# Patient Record
Sex: MALE | Race: WHITE | URBAN - METROPOLITAN AREA
[De-identification: names, ages, dates, MRNs, and addresses within clinical notes are randomized per-mention and may not be internally consistent; named-entity substitution may affect disease eponyms.]

---

## 2018-01-31 ENCOUNTER — EMERGENCY (EMERGENCY)
Facility: HOSPITAL | Age: 28
LOS: 0 days | Discharge: HOME | End: 2018-01-31
Attending: EMERGENCY MEDICINE | Admitting: EMERGENCY MEDICINE
Payer: SELF-PAY

## 2018-01-31 VITALS
TEMPERATURE: 98 F | DIASTOLIC BLOOD PRESSURE: 85 MMHG | OXYGEN SATURATION: 100 % | RESPIRATION RATE: 17 BRPM | SYSTOLIC BLOOD PRESSURE: 130 MMHG | HEART RATE: 84 BPM

## 2018-01-31 VITALS — WEIGHT: 250 LBS | HEIGHT: 70 IN

## 2018-01-31 DIAGNOSIS — B34.9 VIRAL INFECTION, UNSPECIFIED: ICD-10-CM

## 2018-01-31 DIAGNOSIS — R05 COUGH: ICD-10-CM

## 2018-01-31 DIAGNOSIS — R06.2 WHEEZING: ICD-10-CM

## 2018-01-31 DIAGNOSIS — F12.90 CANNABIS USE, UNSPECIFIED, UNCOMPLICATED: ICD-10-CM

## 2018-01-31 PROCEDURE — 99284 EMERGENCY DEPT VISIT MOD MDM: CPT

## 2018-01-31 RX ORDER — IPRATROPIUM/ALBUTEROL SULFATE 18-103MCG
3 AEROSOL WITH ADAPTER (GRAM) INHALATION ONCE
Qty: 0 | Refills: 0 | Status: COMPLETED | OUTPATIENT
Start: 2018-01-31 | End: 2018-01-31

## 2018-01-31 RX ORDER — AZITHROMYCIN 500 MG/1
1 TABLET, FILM COATED ORAL
Qty: 6 | Refills: 0
Start: 2018-01-31 | End: 2018-02-05

## 2018-01-31 RX ORDER — ALBUTEROL 90 UG/1
2 AEROSOL, METERED ORAL ONCE
Qty: 0 | Refills: 0 | Status: COMPLETED | OUTPATIENT
Start: 2018-01-31 | End: 2018-01-31

## 2018-01-31 RX ADMIN — Medication 60 MILLIGRAM(S): at 21:40

## 2018-01-31 RX ADMIN — Medication 3 MILLILITER(S): at 21:19

## 2018-01-31 RX ADMIN — ALBUTEROL 2 PUFF(S): 90 AEROSOL, METERED ORAL at 21:41

## 2018-01-31 RX ADMIN — Medication 75 MILLIGRAM(S): at 21:40

## 2018-01-31 NOTE — ED STATDOCS - ATTENDING CONTRIBUTION TO CARE
Attending Contribution to Care: I, Tanesha Norman, performed the initial face to face bedside interview with this patient regarding history of present illness, review of symptoms and relevant past medical, social and family history.  I completed an independent physical examination.  I was the initial provider who evaluated this patient and the history, physical, and MDM reflect this intial assessment. I have signed out the follow up of any pending tests after the original (i.e. labs, radiological studies) to the ACP with instructions to review any with instructions to review any concerning findings to me prior to discharge.  I have communicated the patient’s plan of care and disposition with the ACP.

## 2018-01-31 NOTE — ED STATDOCS - PROGRESS NOTE DETAILS
DUANE Massey:   Patient has been seen, evaluated and orders have been written by the attending in intake. Patient is stable.  I will follow up the results of orders written and I will continue to evaluate/observe the patient.  Pt. daily marijuana smoker.  Pt. denies tobacco use.  Pt. with chronic cough and wheezing.  Will give nebs/tamiflu/prednisone.

## 2018-01-31 NOTE — ED ADULT NURSE NOTE - CHPI ED SYMPTOMS NEG
no vomiting/no tingling/no dizziness/no weakness/no chills/no decreased eating/drinking/no numbness/no fever

## 2018-01-31 NOTE — ED ADULT NURSE NOTE - OBJECTIVE STATEMENT
Pt presented to Ed c/o flu like symptoms. as per pt, pt had a sudden onset of bodyaches, and fever starting today. been experiencing chest congestions and cough for 3 months.

## 2018-01-31 NOTE — ED STATDOCS - OBJECTIVE STATEMENT
28 y/o M with no PMHx presents to the ED c/o body aches, fever and cough starting today. Pt also c/o chest congestion and cough starting 3 months ago. Pt states that he wakes up every morning gasping for air. Pt did not get his flu shot this year. No PMD at this time. +marijuana use daily.

## 2018-12-27 ENCOUNTER — TRANSCRIPTION ENCOUNTER (OUTPATIENT)
Age: 28
End: 2018-12-27

## 2021-03-11 ENCOUNTER — EMERGENCY (EMERGENCY)
Facility: HOSPITAL | Age: 31
LOS: 0 days | Discharge: HOME | End: 2021-03-11
Attending: EMERGENCY MEDICINE
Payer: COMMERCIAL

## 2021-03-11 VITALS
SYSTOLIC BLOOD PRESSURE: 126 MMHG | HEART RATE: 74 BPM | TEMPERATURE: 98 F | RESPIRATION RATE: 18 BRPM | OXYGEN SATURATION: 95 % | DIASTOLIC BLOOD PRESSURE: 79 MMHG

## 2021-03-11 VITALS — WEIGHT: 250 LBS | HEIGHT: 70 IN

## 2021-03-11 DIAGNOSIS — F12.90 CANNABIS USE, UNSPECIFIED, UNCOMPLICATED: ICD-10-CM

## 2021-03-11 DIAGNOSIS — J45.909 UNSPECIFIED ASTHMA, UNCOMPLICATED: ICD-10-CM

## 2021-03-11 DIAGNOSIS — R42 DIZZINESS AND GIDDINESS: ICD-10-CM

## 2021-03-11 DIAGNOSIS — R19.7 DIARRHEA, UNSPECIFIED: ICD-10-CM

## 2021-03-11 DIAGNOSIS — H81.10 BENIGN PAROXYSMAL VERTIGO, UNSPECIFIED EAR: ICD-10-CM

## 2021-03-11 LAB
ALBUMIN SERPL ELPH-MCNC: 3.8 G/DL — SIGNIFICANT CHANGE UP (ref 3.3–5)
ALP SERPL-CCNC: 76 U/L — SIGNIFICANT CHANGE UP (ref 40–120)
ALT FLD-CCNC: 52 U/L — SIGNIFICANT CHANGE UP (ref 12–78)
ANION GAP SERPL CALC-SCNC: 4 MMOL/L — LOW (ref 5–17)
AST SERPL-CCNC: 21 U/L — SIGNIFICANT CHANGE UP (ref 15–37)
BASOPHILS # BLD AUTO: 0.07 K/UL — SIGNIFICANT CHANGE UP (ref 0–0.2)
BASOPHILS NFR BLD AUTO: 1 % — SIGNIFICANT CHANGE UP (ref 0–2)
BILIRUB SERPL-MCNC: 0.5 MG/DL — SIGNIFICANT CHANGE UP (ref 0.2–1.2)
BUN SERPL-MCNC: 16 MG/DL — SIGNIFICANT CHANGE UP (ref 7–23)
CALCIUM SERPL-MCNC: 8.5 MG/DL — SIGNIFICANT CHANGE UP (ref 8.5–10.1)
CHLORIDE SERPL-SCNC: 108 MMOL/L — SIGNIFICANT CHANGE UP (ref 96–108)
CO2 SERPL-SCNC: 26 MMOL/L — SIGNIFICANT CHANGE UP (ref 22–31)
CREAT SERPL-MCNC: 0.87 MG/DL — SIGNIFICANT CHANGE UP (ref 0.5–1.3)
EOSINOPHIL # BLD AUTO: 0.53 K/UL — HIGH (ref 0–0.5)
EOSINOPHIL NFR BLD AUTO: 7.6 % — HIGH (ref 0–6)
GLUCOSE SERPL-MCNC: 100 MG/DL — HIGH (ref 70–99)
HCT VFR BLD CALC: 48 % — SIGNIFICANT CHANGE UP (ref 39–50)
HGB BLD-MCNC: 16.4 G/DL — SIGNIFICANT CHANGE UP (ref 13–17)
IMM GRANULOCYTES NFR BLD AUTO: 0.7 % — SIGNIFICANT CHANGE UP (ref 0–1.5)
LYMPHOCYTES # BLD AUTO: 2.56 K/UL — SIGNIFICANT CHANGE UP (ref 1–3.3)
LYMPHOCYTES # BLD AUTO: 36.9 % — SIGNIFICANT CHANGE UP (ref 13–44)
MAGNESIUM SERPL-MCNC: 2.1 MG/DL — SIGNIFICANT CHANGE UP (ref 1.6–2.6)
MCHC RBC-ENTMCNC: 29.4 PG — SIGNIFICANT CHANGE UP (ref 27–34)
MCHC RBC-ENTMCNC: 34.2 GM/DL — SIGNIFICANT CHANGE UP (ref 32–36)
MCV RBC AUTO: 86 FL — SIGNIFICANT CHANGE UP (ref 80–100)
MONOCYTES # BLD AUTO: 0.6 K/UL — SIGNIFICANT CHANGE UP (ref 0–0.9)
MONOCYTES NFR BLD AUTO: 8.7 % — SIGNIFICANT CHANGE UP (ref 2–14)
NEUTROPHILS # BLD AUTO: 3.12 K/UL — SIGNIFICANT CHANGE UP (ref 1.8–7.4)
NEUTROPHILS NFR BLD AUTO: 45.1 % — SIGNIFICANT CHANGE UP (ref 43–77)
PHOSPHATE SERPL-MCNC: 2.6 MG/DL — SIGNIFICANT CHANGE UP (ref 2.5–4.5)
PLATELET # BLD AUTO: 272 K/UL — SIGNIFICANT CHANGE UP (ref 150–400)
POTASSIUM SERPL-MCNC: 3.9 MMOL/L — SIGNIFICANT CHANGE UP (ref 3.5–5.3)
POTASSIUM SERPL-SCNC: 3.9 MMOL/L — SIGNIFICANT CHANGE UP (ref 3.5–5.3)
PROT SERPL-MCNC: 7.7 GM/DL — SIGNIFICANT CHANGE UP (ref 6–8.3)
RBC # BLD: 5.58 M/UL — SIGNIFICANT CHANGE UP (ref 4.2–5.8)
RBC # FLD: 12.2 % — SIGNIFICANT CHANGE UP (ref 10.3–14.5)
SODIUM SERPL-SCNC: 138 MMOL/L — SIGNIFICANT CHANGE UP (ref 135–145)
WBC # BLD: 6.93 K/UL — SIGNIFICANT CHANGE UP (ref 3.8–10.5)
WBC # FLD AUTO: 6.93 K/UL — SIGNIFICANT CHANGE UP (ref 3.8–10.5)

## 2021-03-11 PROCEDURE — 93005 ELECTROCARDIOGRAM TRACING: CPT

## 2021-03-11 PROCEDURE — 85025 COMPLETE CBC W/AUTO DIFF WBC: CPT

## 2021-03-11 PROCEDURE — 80053 COMPREHEN METABOLIC PANEL: CPT

## 2021-03-11 PROCEDURE — 36415 COLL VENOUS BLD VENIPUNCTURE: CPT

## 2021-03-11 PROCEDURE — 84100 ASSAY OF PHOSPHORUS: CPT

## 2021-03-11 PROCEDURE — 70450 CT HEAD/BRAIN W/O DYE: CPT | Mod: 26

## 2021-03-11 PROCEDURE — 99284 EMERGENCY DEPT VISIT MOD MDM: CPT | Mod: 25

## 2021-03-11 PROCEDURE — 93010 ELECTROCARDIOGRAM REPORT: CPT

## 2021-03-11 PROCEDURE — 70450 CT HEAD/BRAIN W/O DYE: CPT

## 2021-03-11 PROCEDURE — 83735 ASSAY OF MAGNESIUM: CPT

## 2021-03-11 PROCEDURE — 99285 EMERGENCY DEPT VISIT HI MDM: CPT

## 2021-03-11 RX ORDER — MECLIZINE HCL 12.5 MG
1 TABLET ORAL
Qty: 21 | Refills: 0
Start: 2021-03-11 | End: 2021-03-17

## 2021-03-11 RX ORDER — MECLIZINE HCL 12.5 MG
25 TABLET ORAL ONCE
Refills: 0 | Status: COMPLETED | OUTPATIENT
Start: 2021-03-11 | End: 2021-03-11

## 2021-03-11 RX ORDER — SODIUM CHLORIDE 9 MG/ML
1000 INJECTION INTRAMUSCULAR; INTRAVENOUS; SUBCUTANEOUS ONCE
Refills: 0 | Status: COMPLETED | OUTPATIENT
Start: 2021-03-11 | End: 2021-03-11

## 2021-03-11 RX ADMIN — SODIUM CHLORIDE 2000 MILLILITER(S): 9 INJECTION INTRAMUSCULAR; INTRAVENOUS; SUBCUTANEOUS at 12:35

## 2021-03-11 RX ADMIN — Medication 25 MILLIGRAM(S): at 12:35

## 2021-03-11 NOTE — ED STATDOCS - ATTENDING CONTRIBUTION TO CARE
I, Pineda Castro MD,  performed the initial face to face bedside interview with this patient regarding history of present illness, review of symptoms and relevant past medical, social and family history.  I completed an independent physical examination.  I was the initial provider who evaluated this patient. I have signed out the follow up of any pending tests (i.e. labs, radiological studies) to the ACP.  I have communicated the patient’s plan of care and disposition with the ACP.  The history, relevant review of systems, past medical and surgical history, medical decision making, and physical examination was documented by the scribe in my presence and I attest to the accuracy of the documentation.

## 2021-03-11 NOTE — ED STATDOCS - PATIENT PORTAL LINK FT
You can access the FollowMyHealth Patient Portal offered by [facility] by registering at the following website: [email or web address]. By joining [employer]s FollowMyHealth portal, you will also be able to view your health information using other applications (apps) compatible with our system.

## 2021-03-11 NOTE — ED STATDOCS - EYES, MLM
clear bilaterally.  Pupils equal, round, and reactive to light. Horizontal nystagmus with fast twitch to left.

## 2021-03-11 NOTE — ED ADULT NURSE NOTE - NSIMPLEMENTINTERV_GEN_ALL_ED
Implemented All Universal Safety Interventions:  [place] to call system. Call bell, personal items and telephone within reach. Instruct patient to call for assistance. Room bathroom lighting operational. Non-slip footwear when patient is off stretcher. Physically safe environment: no spills, clutter or unnecessary equipment. Stretcher in lowest position, wheels locked, appropriate side rails in place.

## 2021-03-11 NOTE — ED STATDOCS - OBJECTIVE STATEMENT
29 y/o male with PMHx of asthma presents to ED for intermittent dizziness starting this morning. Pt woke up 6 am, had breakfast, took a nap, then woke up feeling dizzy. Dizziness worse with positional change. Described as room spinning. Reports some nausea, some diarrhea. Non smoker, non drinker. Smokes marijuana. Had COVID vaccine, Moderna, received second dose 2-3 weeks ago. No syncope.

## 2021-03-11 NOTE — ED STATDOCS - NEUROLOGICAL, MLM
sensation is normal and strength is normal. CN 2-12 intact. 5/5 strength throughout. Rapid alternating movements intact. Negative Romberg.

## 2021-03-11 NOTE — ED STATDOCS - NSFOLLOWUPINSTRUCTIONS_ED_ALL_ED_FT
Benign Paroxysmal Positional Vertigo    WHAT YOU NEED TO KNOW:    BPPV is an inner ear condition that causes you to suddenly feel dizzy. Benign means it is not serious or life-threatening. BPPV is caused by a problem with the nerves and structure of your inner ear. BPPV happens when small pieces of calcium break loose and lump together in one of your inner ear canals. Ear Anatomy         DISCHARGE INSTRUCTIONS:    Return to the emergency department if:     You fall during a BPPV episode and are injured.      You have a severe headache that does not go away.      You have new changes in your vision or feel weak or confused.      You have problems hearing, or you have ringing or buzzing in your ears.    Contact your healthcare provider if:     Your BPPV symptoms do not go away or they return.      You have problems with your balance, or you are falling often.      You have new or increased nausea or vomiting with vertigo.      You feel anxious or depressed and do not want to leave your home.      You have questions or concerns about your condition or care.    Medicines:     Medicines may be recommended or prescribed to treat dizziness or nausea.      Take your medicine as directed. Contact your healthcare provider if you think your medicine is not helping or if you have side effects. Tell him of her if you are allergic to any medicine. Keep a list of the medicines, vitamins, and herbs you take. Include the amounts, and when and why you take them. Bring the list or the pill bottles to follow-up visits. Carry your medicine list with you in case of an emergency.    Prevent your symptoms:     Try to avoid sudden head movements. Stand up and lie down slowly.       Raise and support your head when you lie down. Place pillows under your upper back and head or rest in a recliner.       Change your position often when you are lying down. Try not to lie with your head on the same side for long periods of time. Roll over slowly.       Wear protective gear when you ride a bike or play sports. A helmet helps protect your head from injury.    Follow up with your healthcare provider as directed: You may need to return in 1 month to check the progress of your treatment. Write down your questions so you remember to ask them during your visits.      Rest.  Drink plenty of fluids.   Take Meclizine as prescribed.  Follow up with ENT specialist.

## 2021-03-11 NOTE — ED STATDOCS - CLINICAL SUMMARY MEDICAL DECISION MAKING FREE TEXT BOX
Pt presents with dizziness worse with positional changes. Peripheral symptoms, possible positional vertigo. Plan: check labs, EKG, CT head.

## 2021-03-11 NOTE — ED ADULT TRIAGE NOTE - CHIEF COMPLAINT QUOTE
Pt arrives to ED complaining of intermittent episodes of dizziness starting this morning. denies medical history.

## 2021-03-11 NOTE — ED STATDOCS - CARE PROVIDER_API CALL
Jose Beckett  OTOLARYNGOLOGY  [address], [address]  [place] [address]  Phone: [phone]  Fax: [phone]  Follow Up Time:     Min Johnson)  Otolaryngology  [address] [place] [address]  Phone: [phone]  Fax: [phone]  Follow Up Time:

## 2021-05-07 PROBLEM — J45.909 UNSPECIFIED ASTHMA, UNCOMPLICATED: Chronic | Status: ACTIVE | Noted: 2021-03-11

## 2021-05-22 ENCOUNTER — APPOINTMENT (OUTPATIENT)
Dept: DISASTER EMERGENCY | Facility: CLINIC | Age: 31
End: 2021-05-22

## 2021-05-22 DIAGNOSIS — Z01.818 ENCOUNTER FOR OTHER PREPROCEDURAL EXAMINATION: ICD-10-CM

## 2021-05-23 LAB — SARS-COV-2 N GENE NPH QL NAA+PROBE: NOT DETECTED

## 2021-05-25 ENCOUNTER — OUTPATIENT (OUTPATIENT)
Dept: INPATIENT UNIT | Facility: HOSPITAL | Age: 31
LOS: 1 days | Discharge: HOME | End: 2021-05-25
Payer: COMMERCIAL

## 2021-05-25 ENCOUNTER — RESULT REVIEW (OUTPATIENT)
Age: 31
End: 2021-05-25

## 2021-05-25 VITALS
SYSTOLIC BLOOD PRESSURE: 118 MMHG | HEART RATE: 76 BPM | TEMPERATURE: 98 F | OXYGEN SATURATION: 98 % | RESPIRATION RATE: 15 BRPM | DIASTOLIC BLOOD PRESSURE: 78 MMHG | HEIGHT: 70 IN | WEIGHT: 250.45 LBS

## 2021-05-25 VITALS
SYSTOLIC BLOOD PRESSURE: 124 MMHG | DIASTOLIC BLOOD PRESSURE: 67 MMHG | OXYGEN SATURATION: 97 % | TEMPERATURE: 98 F | RESPIRATION RATE: 15 BRPM | HEART RATE: 83 BPM

## 2021-05-25 DIAGNOSIS — Z98.890 OTHER SPECIFIED POSTPROCEDURAL STATES: Chronic | ICD-10-CM

## 2021-05-25 DIAGNOSIS — K40.30 UNILATERAL INGUINAL HERNIA, WITH OBSTRUCTION, WITHOUT GANGRENE, NOT SPECIFIED AS RECURRENT: ICD-10-CM

## 2021-05-25 DIAGNOSIS — K40.90 UNILATERAL INGUINAL HERNIA, WITHOUT OBSTRUCTION OR GANGRENE, NOT SPECIFIED AS RECURRENT: ICD-10-CM

## 2021-05-25 DIAGNOSIS — J45.909 UNSPECIFIED ASTHMA, UNCOMPLICATED: ICD-10-CM

## 2021-05-25 DIAGNOSIS — G47.33 OBSTRUCTIVE SLEEP APNEA (ADULT) (PEDIATRIC): ICD-10-CM

## 2021-05-25 DIAGNOSIS — D17.6 BENIGN LIPOMATOUS NEOPLASM OF SPERMATIC CORD: ICD-10-CM

## 2021-05-25 LAB
ANION GAP SERPL CALC-SCNC: 5 MMOL/L — SIGNIFICANT CHANGE UP (ref 5–17)
BUN SERPL-MCNC: 18 MG/DL — SIGNIFICANT CHANGE UP (ref 7–23)
CALCIUM SERPL-MCNC: 9.3 MG/DL — SIGNIFICANT CHANGE UP (ref 8.5–10.1)
CHLORIDE SERPL-SCNC: 108 MMOL/L — SIGNIFICANT CHANGE UP (ref 96–108)
CO2 SERPL-SCNC: 27 MMOL/L — SIGNIFICANT CHANGE UP (ref 22–31)
CREAT SERPL-MCNC: 0.86 MG/DL — SIGNIFICANT CHANGE UP (ref 0.5–1.3)
GLUCOSE SERPL-MCNC: 87 MG/DL — SIGNIFICANT CHANGE UP (ref 70–99)
HCT VFR BLD CALC: 45.4 % — SIGNIFICANT CHANGE UP (ref 39–50)
HGB BLD-MCNC: 15.4 G/DL — SIGNIFICANT CHANGE UP (ref 13–17)
MCHC RBC-ENTMCNC: 29.1 PG — SIGNIFICANT CHANGE UP (ref 27–34)
MCHC RBC-ENTMCNC: 33.9 GM/DL — SIGNIFICANT CHANGE UP (ref 32–36)
MCV RBC AUTO: 85.7 FL — SIGNIFICANT CHANGE UP (ref 80–100)
PLATELET # BLD AUTO: 254 K/UL — SIGNIFICANT CHANGE UP (ref 150–400)
POTASSIUM SERPL-MCNC: 3.7 MMOL/L — SIGNIFICANT CHANGE UP (ref 3.5–5.3)
POTASSIUM SERPL-SCNC: 3.7 MMOL/L — SIGNIFICANT CHANGE UP (ref 3.5–5.3)
RBC # BLD: 5.3 M/UL — SIGNIFICANT CHANGE UP (ref 4.2–5.8)
RBC # FLD: 12.8 % — SIGNIFICANT CHANGE UP (ref 10.3–14.5)
SODIUM SERPL-SCNC: 140 MMOL/L — SIGNIFICANT CHANGE UP (ref 135–145)
WBC # BLD: 8.36 K/UL — SIGNIFICANT CHANGE UP (ref 3.8–10.5)
WBC # FLD AUTO: 8.36 K/UL — SIGNIFICANT CHANGE UP (ref 3.8–10.5)

## 2021-05-25 PROCEDURE — 86900 BLOOD TYPING SEROLOGIC ABO: CPT

## 2021-05-25 PROCEDURE — 55520 REMOVAL OF SPERM CORD LESION: CPT | Mod: AS,59

## 2021-05-25 PROCEDURE — C1781: CPT

## 2021-05-25 PROCEDURE — 86901 BLOOD TYPING SEROLOGIC RH(D): CPT

## 2021-05-25 PROCEDURE — 36415 COLL VENOUS BLD VENIPUNCTURE: CPT

## 2021-05-25 PROCEDURE — 88304 TISSUE EXAM BY PATHOLOGIST: CPT

## 2021-05-25 PROCEDURE — 85027 COMPLETE CBC AUTOMATED: CPT

## 2021-05-25 PROCEDURE — 80048 BASIC METABOLIC PNL TOTAL CA: CPT

## 2021-05-25 PROCEDURE — 49505 PRP I/HERN INIT REDUC >5 YR: CPT | Mod: AS,LT

## 2021-05-25 PROCEDURE — 86850 RBC ANTIBODY SCREEN: CPT

## 2021-05-25 PROCEDURE — C9399: CPT

## 2021-05-25 PROCEDURE — 88304 TISSUE EXAM BY PATHOLOGIST: CPT | Mod: 26

## 2021-05-25 RX ORDER — ACETAMINOPHEN 500 MG
975 TABLET ORAL ONCE
Refills: 0 | Status: COMPLETED | OUTPATIENT
Start: 2021-05-25 | End: 2021-05-25

## 2021-05-25 RX ORDER — LEVOCETIRIZINE DIHYDROCHLORIDE 0.5 MG/ML
1 SOLUTION ORAL
Qty: 0 | Refills: 0 | DISCHARGE

## 2021-05-25 RX ORDER — FENTANYL CITRATE 50 UG/ML
50 INJECTION INTRAVENOUS
Refills: 0 | Status: DISCONTINUED | OUTPATIENT
Start: 2021-05-25 | End: 2021-05-25

## 2021-05-25 RX ORDER — SODIUM CHLORIDE 9 MG/ML
1000 INJECTION, SOLUTION INTRAVENOUS
Refills: 0 | Status: DISCONTINUED | OUTPATIENT
Start: 2021-05-25 | End: 2021-05-25

## 2021-05-25 RX ORDER — FLUTICASONE FUROATE AND VILANTEROL TRIFENATATE 100; 25 UG/1; UG/1
1 POWDER RESPIRATORY (INHALATION)
Qty: 0 | Refills: 0 | DISCHARGE

## 2021-05-25 RX ORDER — ONDANSETRON 8 MG/1
4 TABLET, FILM COATED ORAL ONCE
Refills: 0 | Status: DISCONTINUED | OUTPATIENT
Start: 2021-05-25 | End: 2021-05-25

## 2021-05-25 RX ORDER — FAMOTIDINE 10 MG/ML
20 INJECTION INTRAVENOUS ONCE
Refills: 0 | Status: COMPLETED | OUTPATIENT
Start: 2021-05-25 | End: 2021-05-25

## 2021-05-25 RX ORDER — OXYCODONE HYDROCHLORIDE 5 MG/1
5 TABLET ORAL ONCE
Refills: 0 | Status: DISCONTINUED | OUTPATIENT
Start: 2021-05-25 | End: 2021-05-25

## 2021-05-25 RX ADMIN — Medication 975 MILLIGRAM(S): at 13:41

## 2021-05-25 RX ADMIN — Medication 975 MILLIGRAM(S): at 13:42

## 2021-05-25 RX ADMIN — FAMOTIDINE 20 MILLIGRAM(S): 10 INJECTION INTRAVENOUS at 13:41

## 2021-05-25 RX ADMIN — OXYCODONE HYDROCHLORIDE 5 MILLIGRAM(S): 5 TABLET ORAL at 17:00

## 2021-05-25 RX ADMIN — FENTANYL CITRATE 50 MICROGRAM(S): 50 INJECTION INTRAVENOUS at 17:00

## 2021-05-25 NOTE — BRIEF OPERATIVE NOTE - NSICDXBRIEFPROCEDURE_GEN_ALL_CORE_FT
PROCEDURES:  Repair, hernia, inguinal, incarcerated, open, using mesh, adult 25-May-2021 16:44:48  Jhonny Heredia

## 2022-04-15 ENCOUNTER — EMERGENCY (EMERGENCY)
Facility: HOSPITAL | Age: 32
LOS: 0 days | Discharge: HOME | End: 2022-04-15
Attending: STUDENT IN AN ORGANIZED HEALTH CARE EDUCATION/TRAINING PROGRAM
Payer: MEDICAID

## 2022-04-15 VITALS — HEIGHT: 70 IN | WEIGHT: 244.93 LBS

## 2022-04-15 VITALS
HEART RATE: 88 BPM | TEMPERATURE: 98 F | RESPIRATION RATE: 22 BRPM | OXYGEN SATURATION: 95 % | SYSTOLIC BLOOD PRESSURE: 146 MMHG | DIASTOLIC BLOOD PRESSURE: 81 MMHG

## 2022-04-15 DIAGNOSIS — J45.901 UNSPECIFIED ASTHMA WITH (ACUTE) EXACERBATION: ICD-10-CM

## 2022-04-15 DIAGNOSIS — Z20.822 CONTACT WITH AND (SUSPECTED) EXPOSURE TO COVID-19: ICD-10-CM

## 2022-04-15 DIAGNOSIS — Z98.890 OTHER SPECIFIED POSTPROCEDURAL STATES: Chronic | ICD-10-CM

## 2022-04-15 DIAGNOSIS — R06.02 SHORTNESS OF BREATH: ICD-10-CM

## 2022-04-15 DIAGNOSIS — F17.200 NICOTINE DEPENDENCE, UNSPECIFIED, UNCOMPLICATED: ICD-10-CM

## 2022-04-15 DIAGNOSIS — F41.9 ANXIETY DISORDER, UNSPECIFIED: ICD-10-CM

## 2022-04-15 DIAGNOSIS — G47.33 OBSTRUCTIVE SLEEP APNEA (ADULT) (PEDIATRIC): ICD-10-CM

## 2022-04-15 PROBLEM — R42 DIZZINESS AND GIDDINESS: Chronic | Status: ACTIVE | Noted: 2021-05-25

## 2022-04-15 LAB
ANION GAP SERPL CALC-SCNC: 3 MMOL/L — LOW (ref 5–17)
BASOPHILS # BLD AUTO: 0.21 K/UL — HIGH (ref 0–0.2)
BASOPHILS NFR BLD AUTO: 2 % — SIGNIFICANT CHANGE UP (ref 0–2)
BUN SERPL-MCNC: 14 MG/DL — SIGNIFICANT CHANGE UP (ref 7–23)
CALCIUM SERPL-MCNC: 8.9 MG/DL — SIGNIFICANT CHANGE UP (ref 8.5–10.1)
CHLORIDE SERPL-SCNC: 111 MMOL/L — HIGH (ref 96–108)
CO2 SERPL-SCNC: 27 MMOL/L — SIGNIFICANT CHANGE UP (ref 22–31)
CREAT SERPL-MCNC: 0.88 MG/DL — SIGNIFICANT CHANGE UP (ref 0.5–1.3)
EGFR: 119 ML/MIN/1.73M2 — SIGNIFICANT CHANGE UP
EOSINOPHIL # BLD AUTO: 3.15 K/UL — HIGH (ref 0–0.5)
EOSINOPHIL NFR BLD AUTO: 30 % — HIGH (ref 0–6)
GLUCOSE SERPL-MCNC: 94 MG/DL — SIGNIFICANT CHANGE UP (ref 70–99)
HCT VFR BLD CALC: 52.1 % — HIGH (ref 39–50)
HGB BLD-MCNC: 17.5 G/DL — HIGH (ref 13–17)
LYMPHOCYTES # BLD AUTO: 3.78 K/UL — HIGH (ref 1–3.3)
LYMPHOCYTES # BLD AUTO: 36 % — SIGNIFICANT CHANGE UP (ref 13–44)
MCHC RBC-ENTMCNC: 30.1 PG — SIGNIFICANT CHANGE UP (ref 27–34)
MCHC RBC-ENTMCNC: 33.6 GM/DL — SIGNIFICANT CHANGE UP (ref 32–36)
MCV RBC AUTO: 89.5 FL — SIGNIFICANT CHANGE UP (ref 80–100)
MONOCYTES # BLD AUTO: 0.32 K/UL — SIGNIFICANT CHANGE UP (ref 0–0.9)
MONOCYTES NFR BLD AUTO: 3 % — SIGNIFICANT CHANGE UP (ref 2–14)
NEUTROPHILS # BLD AUTO: 3.05 K/UL — SIGNIFICANT CHANGE UP (ref 1.8–7.4)
NEUTROPHILS NFR BLD AUTO: 29 % — LOW (ref 43–77)
NRBC # BLD: SIGNIFICANT CHANGE UP /100 WBCS (ref 0–0)
PLATELET # BLD AUTO: 273 K/UL — SIGNIFICANT CHANGE UP (ref 150–400)
POTASSIUM SERPL-MCNC: 4.5 MMOL/L — SIGNIFICANT CHANGE UP (ref 3.5–5.3)
POTASSIUM SERPL-SCNC: 4.5 MMOL/L — SIGNIFICANT CHANGE UP (ref 3.5–5.3)
RAPID RVP RESULT: SIGNIFICANT CHANGE UP
RBC # BLD: 5.82 M/UL — HIGH (ref 4.2–5.8)
RBC # FLD: 13.2 % — SIGNIFICANT CHANGE UP (ref 10.3–14.5)
SARS-COV-2 RNA SPEC QL NAA+PROBE: SIGNIFICANT CHANGE UP
SODIUM SERPL-SCNC: 141 MMOL/L — SIGNIFICANT CHANGE UP (ref 135–145)
WBC # BLD: 10.5 K/UL — SIGNIFICANT CHANGE UP (ref 3.8–10.5)
WBC # FLD AUTO: 10.5 K/UL — SIGNIFICANT CHANGE UP (ref 3.8–10.5)

## 2022-04-15 PROCEDURE — 0225U NFCT DS DNA&RNA 21 SARSCOV2: CPT

## 2022-04-15 PROCEDURE — 94640 AIRWAY INHALATION TREATMENT: CPT

## 2022-04-15 PROCEDURE — 96374 THER/PROPH/DIAG INJ IV PUSH: CPT

## 2022-04-15 PROCEDURE — 93010 ELECTROCARDIOGRAM REPORT: CPT

## 2022-04-15 PROCEDURE — 85025 COMPLETE CBC W/AUTO DIFF WBC: CPT

## 2022-04-15 PROCEDURE — 99285 EMERGENCY DEPT VISIT HI MDM: CPT

## 2022-04-15 PROCEDURE — 99285 EMERGENCY DEPT VISIT HI MDM: CPT | Mod: 25

## 2022-04-15 PROCEDURE — 71045 X-RAY EXAM CHEST 1 VIEW: CPT

## 2022-04-15 PROCEDURE — 71045 X-RAY EXAM CHEST 1 VIEW: CPT | Mod: 26

## 2022-04-15 PROCEDURE — 36415 COLL VENOUS BLD VENIPUNCTURE: CPT

## 2022-04-15 PROCEDURE — 80048 BASIC METABOLIC PNL TOTAL CA: CPT

## 2022-04-15 PROCEDURE — 93005 ELECTROCARDIOGRAM TRACING: CPT

## 2022-04-15 RX ORDER — MAGNESIUM SULFATE 500 MG/ML
2 VIAL (ML) INJECTION ONCE
Refills: 0 | Status: COMPLETED | OUTPATIENT
Start: 2022-04-15 | End: 2022-04-15

## 2022-04-15 RX ORDER — IPRATROPIUM/ALBUTEROL SULFATE 18-103MCG
3 AEROSOL WITH ADAPTER (GRAM) INHALATION
Refills: 0 | Status: COMPLETED | OUTPATIENT
Start: 2022-04-15 | End: 2022-04-15

## 2022-04-15 RX ORDER — ALBUTEROL 90 UG/1
4 AEROSOL, METERED ORAL ONCE
Refills: 0 | Status: DISCONTINUED | OUTPATIENT
Start: 2022-04-15 | End: 2022-04-15

## 2022-04-15 RX ORDER — SODIUM CHLORIDE 9 MG/ML
1000 INJECTION INTRAMUSCULAR; INTRAVENOUS; SUBCUTANEOUS ONCE
Refills: 0 | Status: COMPLETED | OUTPATIENT
Start: 2022-04-15 | End: 2022-04-15

## 2022-04-15 RX ADMIN — Medication 3 MILLILITER(S): at 13:31

## 2022-04-15 RX ADMIN — SODIUM CHLORIDE 1000 MILLILITER(S): 9 INJECTION INTRAMUSCULAR; INTRAVENOUS; SUBCUTANEOUS at 12:35

## 2022-04-15 RX ADMIN — Medication 125 MILLIGRAM(S): at 11:56

## 2022-04-15 RX ADMIN — Medication 3 MILLILITER(S): at 12:35

## 2022-04-15 RX ADMIN — Medication 150 GRAM(S): at 12:07

## 2022-04-15 RX ADMIN — Medication 3 MILLILITER(S): at 12:08

## 2022-04-15 NOTE — ED PROVIDER NOTE - PROGRESS NOTE DETAILS
Patient improved. Wheezing improved. No distress. Conversation had with patient regarding results of labwork and imaging. Patient agrees with plan for discharge at this time. Patient agrees to comply with follow up with PCP. Return to ED precautions and discharge instructions given to patient.

## 2022-04-15 NOTE — ED ADULT TRIAGE NOTE - CHIEF COMPLAINT QUOTE
Pt presents to ER c/o SOB, difficulty catching breath. Hx of asthma. Onset of symptoms began one week PTA and became exacerbated over past 2 days. Denies CP

## 2022-04-15 NOTE — ED PROVIDER NOTE - NSFOLLOWUPINSTRUCTIONS_ED_ALL_ED_FT
Take steroids the next 4 days  Return for any worsening symptoms  Follow up with your primary doctor

## 2022-04-15 NOTE — ED PROVIDER NOTE - CLINICAL SUMMARY MEDICAL DECISION MAKING FREE TEXT BOX
Here with likely asthma exacerbation from viral URI. Will given treatment/steroids. Check labs, XR, reevaluate.

## 2022-04-15 NOTE — ED ADULT NURSE NOTE - OBJECTIVE STATEMENT
Pt presents to ER c/o SOB, difficulty catching breath. Hx of asthma. Pt denies chest pain. States he didn't have any money to buy his asyhma Pt presents to ER c/o SOB, difficulty catching breath. Hx of asthma. Pt denies chest pain. States he didn't have any money to buy his asthma medication. Pt in NAD.

## 2022-04-15 NOTE — ED PROVIDER NOTE - NS ED ROS FT
Constitutional: No reported recent fever.  Neurological: No reported acute headache.  Eyes: No reported new vision changes.   Ears, Nose, Mouth, Throat: No reported acute sore throat.  Cardiovascular: No reported current chest pain.  Respiratory: +SOB   Gastrointestinal: No reported vomiting.  Genitourinary: No reported new urinary problems.  Musculoskeletal: No reported acute extremity pain.  Integumentary (skin and/or breast): No reported new rash.

## 2022-04-15 NOTE — ED PROVIDER NOTE - OBJECTIVE STATEMENT
30 yo male w/PMHx of vertigo, LEEROY, anxiety, asthma presents to the ED c/o SOB x4days ago. Pt states he has been wheezing and been SOB, worsening over the last several days. Pt is on inhalers but hasn't been able to get it since he lost his insurance. Denies fever, chills. Pt states he has a productive cough with green phlegm. Pt PCP usually gives him a Zpack and prednisone and is fine. NKDA. Smoker. Pt states he smokes marijuana. No other complaints at this time. 32 yo male w/PMHx of vertigo, LEEROY, anxiety, asthma presents to the ED c/o SOB x4days ago. Pt states he has been wheezing and been SOB, worsening over the last several days. Pt is on inhalers but hasn't been able to get it since he lost his insurance. Denies fever, chills. Pt states he has a productive cough with green phlegm. Pt PCP usually gives him a Zpack and prednisone and is fine. no recent admissions. denies prior ICU stays. NKDA. Smoker. Pt states he smokes marijuana. No other complaints at this time.

## 2022-04-15 NOTE — ED PROVIDER NOTE - PHYSICAL EXAMINATION
Vital signs as available reviewed.  General:  No acute distress.  Head:  Normocephalic, atraumatic.  Eyes:  Conjunctiva pink, no icterus.  Cardiovascular:  Regular rate, no obvious murmur.  Respiratory:  diffuse wheeze   Abdomen:  Soft, non-tender.  Musculoskeletal:  No obvious deformity.  Neurologic: Alert and oriented, moving all extremities.  Skin:  Warm and dry.

## 2022-04-15 NOTE — ED STATDOCS - PROGRESS NOTE DETAILS
32 yo M hx of asthma, presents with SOB.  States symptoms started Monday.  C/o increased SOB, productive cough.  Denies fever.  Unable to afford asthma medications.  Satting 92% on RA, diffuse wheezing/decreased breath sounds bilaterally.  Sending to [facility] for further treatment. Wilfrido Bazzi D.O.

## 2022-04-15 NOTE — ED PROVIDER NOTE - PATIENT PORTAL LINK FT
You can access the FollowMyHealth Patient Portal offered by [facility] by registering at the following website: [email or web address]. By joining [employer]’s FollowMyHealth portal, you will also be able to view your health information using other applications (apps) compatible with our system.

## 2022-05-05 ENCOUNTER — EMERGENCY (EMERGENCY)
Facility: HOSPITAL | Age: 32
LOS: 0 days | Discharge: HOME | End: 2022-05-05
Attending: EMERGENCY MEDICINE
Payer: MEDICAID

## 2022-05-05 VITALS
RESPIRATION RATE: 20 BRPM | WEIGHT: 240.08 LBS | TEMPERATURE: 98 F | SYSTOLIC BLOOD PRESSURE: 123 MMHG | OXYGEN SATURATION: 93 % | HEART RATE: 82 BPM | HEIGHT: 70 IN | DIASTOLIC BLOOD PRESSURE: 90 MMHG

## 2022-05-05 VITALS
OXYGEN SATURATION: 96 % | TEMPERATURE: 98 F | SYSTOLIC BLOOD PRESSURE: 134 MMHG | RESPIRATION RATE: 19 BRPM | HEART RATE: 65 BPM | DIASTOLIC BLOOD PRESSURE: 87 MMHG

## 2022-05-05 DIAGNOSIS — G47.33 OBSTRUCTIVE SLEEP APNEA (ADULT) (PEDIATRIC): ICD-10-CM

## 2022-05-05 DIAGNOSIS — R05.9 COUGH, UNSPECIFIED: ICD-10-CM

## 2022-05-05 DIAGNOSIS — J45.909 UNSPECIFIED ASTHMA, UNCOMPLICATED: ICD-10-CM

## 2022-05-05 DIAGNOSIS — Z98.890 OTHER SPECIFIED POSTPROCEDURAL STATES: Chronic | ICD-10-CM

## 2022-05-05 DIAGNOSIS — R50.9 FEVER, UNSPECIFIED: ICD-10-CM

## 2022-05-05 DIAGNOSIS — R07.89 OTHER CHEST PAIN: ICD-10-CM

## 2022-05-05 PROCEDURE — 94640 AIRWAY INHALATION TREATMENT: CPT

## 2022-05-05 PROCEDURE — 99283 EMERGENCY DEPT VISIT LOW MDM: CPT | Mod: 25

## 2022-05-05 PROCEDURE — 99285 EMERGENCY DEPT VISIT HI MDM: CPT

## 2022-05-05 PROCEDURE — 93010 ELECTROCARDIOGRAM REPORT: CPT

## 2022-05-05 PROCEDURE — 93005 ELECTROCARDIOGRAM TRACING: CPT

## 2022-05-05 RX ORDER — ALBUTEROL 90 UG/1
4 AEROSOL, METERED ORAL ONCE
Refills: 0 | Status: COMPLETED | OUTPATIENT
Start: 2022-05-05 | End: 2022-05-05

## 2022-05-05 RX ADMIN — Medication 60 MILLIGRAM(S): at 20:07

## 2022-05-05 RX ADMIN — ALBUTEROL 4 PUFF(S): 90 AEROSOL, METERED ORAL at 20:07

## 2022-05-05 NOTE — ED STATDOCS - CLINICAL SUMMARY MEDICAL DECISION MAKING FREE TEXT BOX
Pt with a history of adult onset asthma with continued difficulty breathing. Plan: albuterol with spacer. Pt should start on prednisone taper. Pt with a history of adult onset asthma with continued difficulty breathing. Plan: albuterol with spacer. Pt should start on prednisone taper.    PA note: All labwork/radiology results discussed in detail with patient. Patient re-examined and re-evaluated. Patient feels much better at this time, O2sat 96%, Lung re-exam with very minimal wheezing at this time. ED evaluation, Diagnosis and management discussed with the patient in detail. Workup results discussed with ED attending, OK to PA home. Close PULM Dr. Vergara follow up encouraged, aftercare to assist with scheduling appointment ASAP. Strict ED return instructions discussed in detail and patient given the opportunity to ask any questions about their discharge diagnosis and instructions. Patient verbalized understanding. ~ Tom Ricardo PA-C

## 2022-05-05 NOTE — ED ADULT TRIAGE NOTE - HEIGHT IN INCHES
Called pt to inform him cialis was sent to pharmacy. No answer, left message on voicemail to call office  if he have any additional questions or concerns.    
Order is pended if you approve     
Please inform patient that Cialis was sent to his pharmacy.    thanks  
10

## 2022-05-05 NOTE — ED ADULT TRIAGE NOTE - CHIEF COMPLAINT QUOTE
pt reports worsening chest tightness. treated for URI 2 weeks ago with a 4 day course of steroids. o2 95% hr 95

## 2022-05-05 NOTE — ED STATDOCS - PROGRESS NOTE DETAILS
PA: Patient is a 31 year old male with PMHx of adult onset asthma, sleep apnea, vertigo, ?COPD who presents to [facility] c/o continued cough, wheezing, and difficulty breathing. Patient was seen here in ED 3 weeks ago, dc'd home on 4 day course of steroids. Pt smokes marijuana and reports he has been smoking less since URI. Denies JOYNER, fever, chills, CP and any other symptoms. Denies history of hospitalizations or intubation for asthma. No other complaints at this time. ~Tom Ricardo PA-C · PA note: All labwork/radiology results discussed in detail with patient. Patient re-examined and re-evaluated. Patient feels much better at this time, O2sat 96%, Lung re-exam with very minimal wheezing at this time. ED evaluation, Diagnosis and management discussed with the patient in detail. Workup results discussed with ED attending, OK to dc home. Close PULM Dr. Vergara follow up encouraged, aftercare to assist with scheduling appointment ASAP. Strict ED return instructions discussed in detail and patient given the opportunity to ask any questions about their discharge diagnosis and instructions. Patient verbalized understanding. ~ Tom Ricardo PA-C

## 2022-05-05 NOTE — ED STATDOCS - ATTENDING APP SHARED VISIT CONTRIBUTION OF CARE
I, Pineda Castro MD,  performed the initial face to face bedside interview with this patient regarding history of present illness, review of symptoms and relevant past medical, social and family history.  I completed an independent physical examination.  I was the initial provider who evaluated this patient.   I personally saw the patient and performed a substantive portion of the visit including all aspects of the medical decision making.  I have signed out the follow up of any pending tests (i.e. labs, radiological studies) to the ANNE-MARIE.  I have communicated the patient’s plan of care and disposition with the ANNE-MARIE.  The history, relevant review of systems, past medical and surgical history, medical decision making, and physical examination was documented by the scribe in my presence and I attest to the accuracy of the documentation.

## 2022-05-05 NOTE — ED STATDOCS - PHYSICAL EXAMINATION
PA NOTE: GEN: AOX3, NAD. HEENT: Throat clear. Airway is patent. EYES: PERRLA. EOMI. Head: NC/AT. NECK: Supple, No JVD. FROM. C-spine non-tender. CV:S1S2, RRR, LUNGS: Slight congestion, +Scattered wheezing throughout. O2sat 93% RA. CTA b/l. No w/r/r. CHEST: Equal chest expansion and rise. No deformity. ABD: Soft, NT/ND, no rebound, no guarding. No CVAT. EXT: No e/c/c. 2+ distal pulses. SKIN: No rashes. NEURO: No focal deficits. CN II-XII intact. FROM. 5/5 motor and sensory. ~Tom Ricardo PA-C

## 2022-05-05 NOTE — ED STATDOCS - CARE PROVIDER_API CALL
Buster Vergara  INTERNAL MEDICINE  [address] [place] [address]  Phone: [phone]  Fax: [phone]  Follow Up Time: Urgent

## 2022-05-05 NOTE — ED STATDOCS - OBJECTIVE STATEMENT
32 y/o male with a PMHx of adult onset asthma, sleep apnea presents to the ED c/o continued cough, wheezing, and difficulty breathing s/p diagnosed with a URI 2 weeks ago and completing a 4 days course of steroids. Pt smokes marijuana and reports he has been smoking less since URI. Denies CP and any other symptoms. Denies history of hospitalizations or intubation for asthma. No other complaints at this time.   PCP/Pulm: Dr. Vergara

## 2022-05-05 NOTE — ED STATDOCS - NSFOLLOWUPINSTRUCTIONS_ED_ALL_ED_FT
Asthma Attack       Asthma attack, also called acute bronchospasm, is the sudden narrowing and tightening of the air passages, which limits the amount of oxygen that can get into the lungs. The narrowing is caused by inflammation and tightening of the muscles in the air tubes (bronchi) in the lungs.    Too much mucus is also produced, which narrows the airways more. This can cause trouble breathing, loud breathing (wheezing), and coughing. The goal of treatment is to open the airways in the lungs and reduce inflammation.      What are the causes?    Possible causes or triggers of this condition include:  •Animal dander, dust mites, or cockroaches.      •Mold, pollen from trees or grass, or cold air.      •Air pollutants such as dust, household , aerosol sprays, strong chemicals, strong odors, and smoke of any kind.      •Stress or strong emotions such as crying or laughing hard.      •Exercise or activity that requires a lot of energy.      •Substances in foods and drinks, such as dried fruits and wine, called sulfites.    •Certain medicines or medical conditions such as:  •Aspirin or beta-blockers.      •Infections or inflammatory conditions, such as a flu (influenza), a cold, pneumonia, or inflammation of the nasal membranes (rhinitis).      •Gastroesophageal reflux disease (GERD). GERD is a condition in which stomach acid backs up into your esophagus and spills into your trachea (windpipe), which can irritate your airways.          What are the signs or symptoms?    Symptoms of this condition include:  •Wheezing. This may sound like whistling while breathing. This may only happen at night.      •Excessive coughing. This may only happen at night.      •Chest tightness or pain.      •Shortness of breath.      •Feeling like you cannot get enough air no matter how hard you breathe (air hunger).        How is this diagnosed?    This condition may be diagnosed based on:  •Your medical history.      •Your symptoms.      •A physical exam.    •Tests to check for other causes of your symptoms or other conditions that may have triggered your asthma attack. These tests may include:  •A chest X-ray.      •Blood tests.      •Tests to assess lung function, such as breathing into a device that measures how much air you can inhale and exhale (spirometry).          How is this treated?    Treatment for this condition depends on the severity and cause of your asthma attack.•For mild attacks, you may receive medicines through a hand-held inhaler (metered dose inhaler, or MDI) or through a device that turns liquid medicine into a mist (nebulizer). These medicines include:  •Quick relief or rescue medicines that quickly relax the airways and lungs.      •Long-acting medicines that are used daily to prevent (control) your asthma symptoms.        •For moderate or severe attacks, you may be treated with steroid medicines by mouth or through an IV injection at the hospital.      •For severe attacks, you may need oxygen therapy or a breathing machine (ventilator).      •If your asthma attack was caused by an infection from bacteria, you will be given antibiotic medicines.        Follow these instructions at home:    Medicines   •Take over-the-counter and prescription medicines only as told by your health care provider.   •Keep your medicines up-to-date.       •Make sure you have all of your medicines available at all times.        •If you were prescribed an antibiotic medicine, take it as told by your health care provider. Do not stop taking the antibiotic even if you start to feel better.      •Tell your doctor if you may be pregnant to make sure your asthma medicine is safe to use during pregnancy.        Avoiding triggers      •Keep track of things that trigger your asthma attacks. Avoid exposure to these triggers.      • Do not use any products that contain nicotine or tobacco, such as cigarettes, e-cigarettes, and chewing tobacco. If you need help quitting, ask your health care provider.      •When there is a lot of pollen, air pollution, or humidity, keep windows closed and use an air conditioner or go to places with air conditioning.      Asthma action plan   •Work with your health care provider to make a written plan for managing and treating your asthma attacks (asthma action plan). This plan should include:  •A list of your asthma triggers and how to avoid them.      •A list of symptoms that you may have during an asthma attack.      •Information about which medicine to take, when to take the medicine and how much of the medicine to take.      •Information to help you understand your peak flow measurements.      •Daily actions that you can take to control your asthma symptoms.      •Contact information for your health care providers.        •If you have an asthma attack, act quickly. Follow the emergency steps on your written asthma action plan. This may prevent you from needing to go to the hospital.      General instructions     •Avoid excessive exercise or activity until your asthma attack goes away. Ask your health care provider what activities are safe for you and when you can return to your normal activities.      •Stay up to date on all your vaccines, such as flu and pneumonia vaccines.      •Drink enough fluid to keep your urine pale yellow. Staying hydrated helps keep mucus in your lungs thin so it can be coughed up easily.      • Do not use alcohol until you have recovered.      •Keep all follow-up visits as told by your health care provider. This is important. Asthma requires careful medical care.        Contact a health care provider if:    •You have followed your action plan for 1 hour and your peak flow reading is still at 50–79%. This is in the yellow zone, which means "caution."      •You need to use your quick reliever medicine more frequently than normal.      •Your medicines are causing side effects, such as rash, itching, swelling, or trouble breathing.      •Your symptoms do not improve after 48 hours.      •You cough up mucus that is thicker than usual.      •You have a fever.        Get help right away if:    •Your peak flow reading is less than 50% of your personal best. This is in the red zone, which means "danger."      •You have trouble breathing.      •You develop chest pain or discomfort.      •Your medicines no longer seem to be helping.      •You are coughing up bloody mucus.      •You have a fever and your symptoms suddenly get worse.      •You have trouble swallowing.      •You feel very tired, and breathing becomes tiring.      These symptoms may represent a serious problem that is an emergency. Do not wait to see if the symptoms will go away. Get medical help right away. Call your local emergency services (911 in the [place] Do not drive yourself to the hospital.       Summary    •Asthma attacks are caused by narrowing or tightness in air passages, which causes shortness of breath, coughing, and loud breathing (wheezing).      •Many things can trigger an asthma attack, such as allergens, weather changes, exercise, strong odors, and smoke of any kind.      •If you have an asthma attack, act quickly. Follow the emergency steps on your written asthma action plan.      •Get help right away if you have severe trouble breathing, chest pain, or fever, or if your home medicines are no longer helping with your symptoms.      This information is not intended to replace advice given to you by your health care provider. Make sure you discuss any questions you have with your health care provider.

## 2022-05-05 NOTE — ED STATDOCS - PATIENT PORTAL LINK FT
You can access the FollowMyHealth Patient Portal offered by [facility] by registering at the following website: [email or web address]. By joining [employer]’s FollowMyHealth portal, you will also be able to view your health information using other applications (apps) compatible with our system.

## 2022-05-05 NOTE — ED STATDOCS - NS ED ATTENDING STATEMENT MOD
This was a shared visit with the ANNE-MARIE. I reviewed and verified the documentation and independently performed the documented:

## 2022-05-26 ENCOUNTER — EMERGENCY (EMERGENCY)
Facility: HOSPITAL | Age: 32
LOS: 0 days | Discharge: HOME | End: 2022-05-26
Attending: EMERGENCY MEDICINE
Payer: MEDICAID

## 2022-05-26 VITALS
TEMPERATURE: 98 F | OXYGEN SATURATION: 99 % | RESPIRATION RATE: 18 BRPM | SYSTOLIC BLOOD PRESSURE: 111 MMHG | HEART RATE: 71 BPM | DIASTOLIC BLOOD PRESSURE: 71 MMHG

## 2022-05-26 VITALS — HEIGHT: 70 IN | WEIGHT: 250 LBS

## 2022-05-26 DIAGNOSIS — F17.200 NICOTINE DEPENDENCE, UNSPECIFIED, UNCOMPLICATED: ICD-10-CM

## 2022-05-26 DIAGNOSIS — Z98.890 OTHER SPECIFIED POSTPROCEDURAL STATES: Chronic | ICD-10-CM

## 2022-05-26 DIAGNOSIS — J45.901 UNSPECIFIED ASTHMA WITH (ACUTE) EXACERBATION: ICD-10-CM

## 2022-05-26 DIAGNOSIS — G47.33 OBSTRUCTIVE SLEEP APNEA (ADULT) (PEDIATRIC): ICD-10-CM

## 2022-05-26 DIAGNOSIS — R06.2 WHEEZING: ICD-10-CM

## 2022-05-26 DIAGNOSIS — F41.9 ANXIETY DISORDER, UNSPECIFIED: ICD-10-CM

## 2022-05-26 PROCEDURE — 99284 EMERGENCY DEPT VISIT MOD MDM: CPT

## 2022-05-26 PROCEDURE — 99283 EMERGENCY DEPT VISIT LOW MDM: CPT

## 2022-05-26 RX ORDER — ALBUTEROL 90 UG/1
2 AEROSOL, METERED ORAL
Qty: 1 | Refills: 0
Start: 2022-05-26 | End: 2022-06-24

## 2022-05-26 NOTE — ED STATDOCS - PATIENT PORTAL LINK FT
You can access the FollowMyHealth Patient Portal offered by [facility] by registering at the following website: [email or web address]. By joining [employer]’s FollowMyHealth portal, you will also be able to view your health information using other applications (apps) compatible with our system.

## 2022-05-26 NOTE — ED STATDOCS - OBJECTIVE STATEMENT
32 yo male with hx asthma, yousuf, smoker, c/o congestion and wheezing. patient states he has been in ED several times. continues to smoke daily  denies fever or chills  denies chest pain  denies vomiting or diarrhea  has not followed up

## 2022-05-26 NOTE — ED ADULT TRIAGE NOTE - CHIEF COMPLAINT QUOTE
Pt comes to the ED complaining of asthma exacerbation. Pt states that he uses an inhaler and used it PTA in ED. Pt states that he has received temporary relief. Pt states that he was here 2 weeks ago for similar symptoms.

## 2022-05-26 NOTE — ED STATDOCS - CARE PROVIDER_API CALL
Frankie Pan)  Internal Medicine  [address], [address]  [place] [address]  Phone: [phone]  Fax: [phone]  Follow Up Time:     Anushka Krause)  Internal Medicine  [address], [address]  [place] [address]  Phone: [phone]  Fax: [phone]  Follow Up Time:     Buster Vergara  INTERNAL MEDICINE  [address] [place] [address]  Phone: [phone]  Fax: [phone]  Follow Up Time:

## 2022-05-26 NOTE — ED STATDOCS - PROVIDER TOKENS
PROVIDER:[TOKEN:[[id number]]],PROVIDER:[TOKEN:[[id number]]],PROVIDER:[TOKEN:[[id number]]]

## 2023-01-03 ENCOUNTER — EMERGENCY (EMERGENCY)
Facility: HOSPITAL | Age: 33
LOS: 0 days | Discharge: HOME | End: 2023-01-03
Attending: EMERGENCY MEDICINE
Payer: MEDICAID

## 2023-01-03 VITALS
OXYGEN SATURATION: 100 % | DIASTOLIC BLOOD PRESSURE: 60 MMHG | SYSTOLIC BLOOD PRESSURE: 108 MMHG | RESPIRATION RATE: 18 BRPM | TEMPERATURE: 98 F | HEART RATE: 73 BPM

## 2023-01-03 VITALS — WEIGHT: 229.94 LBS | HEIGHT: 70 IN

## 2023-01-03 DIAGNOSIS — F41.9 ANXIETY DISORDER, UNSPECIFIED: ICD-10-CM

## 2023-01-03 DIAGNOSIS — Z98.890 OTHER SPECIFIED POSTPROCEDURAL STATES: Chronic | ICD-10-CM

## 2023-01-03 DIAGNOSIS — G47.33 OBSTRUCTIVE SLEEP APNEA (ADULT) (PEDIATRIC): ICD-10-CM

## 2023-01-03 DIAGNOSIS — S86.911A STRAIN OF UNSPECIFIED MUSCLE(S) AND TENDON(S) AT LOWER LEG LEVEL, RIGHT LEG, INITIAL ENCOUNTER: ICD-10-CM

## 2023-01-03 DIAGNOSIS — L90.5 SCAR CONDITIONS AND FIBROSIS OF SKIN: ICD-10-CM

## 2023-01-03 DIAGNOSIS — J45.909 UNSPECIFIED ASTHMA, UNCOMPLICATED: ICD-10-CM

## 2023-01-03 DIAGNOSIS — M25.561 PAIN IN RIGHT KNEE: ICD-10-CM
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
## 2023-01-03 PROCEDURE — 73562 X-RAY EXAM OF KNEE 3: CPT | Mod: 26,RT

## 2023-01-03 PROCEDURE — 99284 EMERGENCY DEPT VISIT MOD MDM: CPT

## 2023-01-03 PROCEDURE — 73562 X-RAY EXAM OF KNEE 3: CPT | Mod: RT

## 2023-01-03 PROCEDURE — 99283 EMERGENCY DEPT VISIT LOW MDM: CPT

## 2023-01-03 RX ORDER — IBUPROFEN 200 MG
600 TABLET ORAL ONCE
Refills: 0 | Status: COMPLETED | OUTPATIENT
Start: 2023-01-03 | End: 2023-01-03

## 2023-01-03 RX ADMIN — Medication 600 MILLIGRAM(S): at 11:02

## 2023-01-03 NOTE — ED STATDOCS - PROGRESS NOTE DETAILS
pt aware of imaging results and in a knee immobilizer. pt will fu with orthopedic and pmd and knows to return to ED for any worsening of symptoms. pt offered Nsaids and states he has at home, advised to take if needed.  pt well appearing on dc ambulating and agrees with plan. -Monica Nieto PA-C

## 2023-01-03 NOTE — ED STATDOCS - WR ORDER STATUS 1
Performed Intermediate Repair Preamble Text (Leave Blank If You Do Not Want): Undermining was performed with blunt dissection. Resulted

## 2023-01-03 NOTE — ED STATDOCS - CLINICAL SUMMARY MEDICAL DECISION MAKING FREE TEXT BOX
31 y/o male with right knee pain. No trauma. Possible sprain. Less likely DVT. Plan: XR, splint, keep weight off right leg for next 2 days, follow up ortho. 33 y/o male with right knee pain. No trauma.   Possible sprain, fx  Plan: XR, splint, keep weight off right leg for next 2 days, follow up ortho.

## 2023-01-03 NOTE — ED ADULT NURSE NOTE - OBJECTIVE STATEMENT
Patient is a 32y male ambulatory to the ED c/o right knee pain. Patient states he has a hx of injury to the knee, was getting out of the shower yesterday and it began to hurt. Doesn't report any fall or injury to the knee, no obvious deformity to the extremity. Patient able to ambulate with some discomfort.

## 2023-01-03 NOTE — ED STATDOCS - ATTENDING APP SHARED VISIT CONTRIBUTION OF CARE
I, Pinead Castro MD,  performed the initial face to face bedside interview with this patient regarding history of present illness, review of symptoms and relevant past medical, social and family history.  I completed an independent physical examination.  I was the initial provider who evaluated this patient.   I personally saw the patient and performed a substantive portion of the visit including all aspects of the medical decision making.  I have signed out the follow up of any pending tests (i.e. labs, radiological studies) to the ANNE-MARIE.  I have communicated the patient’s plan of care and disposition with the ANNE-MARIE.  The history, relevant review of systems, past medical and surgical history, medical decision making, and physical examination was documented by the scribe in my presence and I attest to the accuracy of the documentation.

## 2023-01-03 NOTE — ED ADULT TRIAGE NOTE - GLASGOW COMA SCALE: EYE OPENING, MLM
(E4) spontaneous Airway patent, nasal mucosa clear, mouth with normal mucosa. Throat has no vesicles, no oropharyngeal exudates and uvula is midline. Clear tympanic membranes bilaterally.

## 2023-01-03 NOTE — ED STATDOCS - OBJECTIVE STATEMENT
33 y/o male with a PMHx of anxiety, asthma, LEEROY, vertigo, h/o b/l knee surgery presents to the ED c/o right knee pain since yesterday. No trauma or injury. No hx of DVT. No other complaints at this time.

## 2023-01-03 NOTE — ED STATDOCS - CARE PROVIDER_API CALL
Kendall Zee (DO)  Orthopaedic Surgery  [address] [place] [address]  Phone: [phone]  Fax: [phone]  Follow Up Time: Urgent

## 2023-01-03 NOTE — ED STATDOCS - NSFOLLOWUPINSTRUCTIONS_ED_ALL_ED_FT
Strain    A strain is a stretch or tear in one of the muscles in your body. This is caused by an injury to the area such as a twisting mechanism. Symptoms include pain, swelling, or bruising. Rest that area over the next several days and slowly resume activity when tolerated. Ice can help with swelling and pain.     SEEK IMMEDIATE MEDICAL CARE IF YOU HAVE ANY OF THE FOLLOWING SYMPTOMS: worsening pain, inability to move that body part, numbness or tingling.      fu with orthopedic as directed   take NSAIDs for pain if needed  ice elevate rest Knee   return to ED for any worsening of symptoms

## 2023-01-03 NOTE — ED STATDOCS - PATIENT PORTAL LINK FT
You can access the FollowMyHealth Patient Portal offered by [facility] by registering at the following website: [email or web address]. By joining [employer]’s FollowMyHealth portal, you will also be able to view your health information using other applications (apps) compatible with our system.

## 2023-01-03 NOTE — ED ADULT TRIAGE NOTE - CHIEF COMPLAINT QUOTE
Patient is alert and oriented X3, presenting to the ER with c/o right knee pain. Patient reports "the pain started yesterday after I got out of the shower. I came to the ER because work wanted me to get it checked out since I was limping." Denies numbness or tingling in extremity, CP or SOB. No medications taken prior to arrival.   HX: asthma.

## 2023-01-03 NOTE — ED STATDOCS - MUSCULOSKELETAL, MLM
Right knee with mild swelling. No redness. No laxity. Pain to palpation lateral aspect of right knee. Patella normal setting. Old surgical scars to right knee, healed. No palpable cord. No pedal edema right leg.

## 2023-11-07 NOTE — ED ADULT TRIAGE NOTE - INTERNATIONAL TRAVEL
Spoke to patient parent/guardian who states that Dr. Baig states that he was going to send a letter or order to patient's PCP, Dr. Austin, at [facility]. Parent was informed that Dr. Baig would send in orders/letter/note to PCP by the end of day 11/08/2023. Parent verbalized understanding.    No

## 2023-12-03 ENCOUNTER — EMERGENCY (EMERGENCY)
Facility: HOSPITAL | Age: 33
LOS: 0 days | Discharge: HOME | End: 2023-12-03
Attending: STUDENT IN AN ORGANIZED HEALTH CARE EDUCATION/TRAINING PROGRAM
Payer: MEDICAID

## 2023-12-03 VITALS
DIASTOLIC BLOOD PRESSURE: 88 MMHG | RESPIRATION RATE: 19 BRPM | SYSTOLIC BLOOD PRESSURE: 123 MMHG | OXYGEN SATURATION: 98 % | HEART RATE: 95 BPM | TEMPERATURE: 97 F

## 2023-12-03 VITALS — WEIGHT: 240.08 LBS | HEIGHT: 70 IN

## 2023-12-03 DIAGNOSIS — M54.9 DORSALGIA, UNSPECIFIED: ICD-10-CM

## 2023-12-03 DIAGNOSIS — R07.81 PLEURODYNIA: ICD-10-CM

## 2023-12-03 DIAGNOSIS — Z98.890 OTHER SPECIFIED POSTPROCEDURAL STATES: Chronic | ICD-10-CM

## 2023-12-03 DIAGNOSIS — F17.200 NICOTINE DEPENDENCE, UNSPECIFIED, UNCOMPLICATED: ICD-10-CM

## 2023-12-03 DIAGNOSIS — R07.89 OTHER CHEST PAIN: ICD-10-CM

## 2023-12-03 PROCEDURE — 71046 X-RAY EXAM CHEST 2 VIEWS: CPT | Mod: 26

## 2023-12-03 PROCEDURE — 93005 ELECTROCARDIOGRAM TRACING: CPT

## 2023-12-03 PROCEDURE — 93010 ELECTROCARDIOGRAM REPORT: CPT

## 2023-12-03 PROCEDURE — 71046 X-RAY EXAM CHEST 2 VIEWS: CPT

## 2023-12-03 PROCEDURE — 99284 EMERGENCY DEPT VISIT MOD MDM: CPT

## 2023-12-03 PROCEDURE — 96372 THER/PROPH/DIAG INJ SC/IM: CPT

## 2023-12-03 PROCEDURE — 99283 EMERGENCY DEPT VISIT LOW MDM: CPT | Mod: 25

## 2023-12-03 RX ORDER — TRAMADOL HYDROCHLORIDE 50 MG/1
1 TABLET ORAL
Qty: 8 | Refills: 0
Start: 2023-12-03 | End: 2023-12-04

## 2023-12-03 RX ORDER — LIDOCAINE 4 G/100G
1 CREAM TOPICAL ONCE
Refills: 0 | Status: COMPLETED | OUTPATIENT
Start: 2023-12-03 | End: 2023-12-03

## 2023-12-03 RX ORDER — CYCLOBENZAPRINE HYDROCHLORIDE 10 MG/1
1 TABLET, FILM COATED ORAL
Qty: 12 | Refills: 0
Start: 2023-12-03 | End: 2023-12-06

## 2023-12-03 RX ORDER — IBUPROFEN 200 MG
1 TABLET ORAL
Qty: 16 | Refills: 0
Start: 2023-12-03 | End: 2023-12-06

## 2023-12-03 RX ORDER — KETOROLAC TROMETHAMINE 30 MG/ML
30 SYRINGE (ML) INJECTION ONCE
Refills: 0 | Status: DISCONTINUED | OUTPATIENT
Start: 2023-12-03 | End: 2023-12-03

## 2023-12-03 RX ADMIN — Medication 30 MILLIGRAM(S): at 12:24

## 2023-12-03 RX ADMIN — LIDOCAINE 1 PATCH: 4 CREAM TOPICAL at 12:25

## 2023-12-03 NOTE — ED STATDOCS - PROGRESS NOTE DETAILS
CXR with no acute findings, pt feeling better after meds, will dc home with nsaids, muscle relaxers, rest, no heavy lifting, pmd f/u, return precautions given  Lana Pollock PA-C Patient seen and evaluated, ED attending note and orders reviewed, will continue with patient follow up and care -Lana Pollock PA-C

## 2023-12-03 NOTE — ED STATDOCS - PHYSICAL EXAMINATION
GENERAL: A&Ox4, non-toxic appearing, no acute distress  HEENT: NCAT, EOMI, oral mucosa moist, normal conjunctiva  RESP: CTAB, no respiratory distress, no wheezes/rhonchi/rales, speaking in full sentences  CV: RRR, no murmurs/rubs/gallops  ABDOMEN: soft, non-tender, non-distended, no guarding, no rebound tenderness  MSK: no visible deformities, left axillary rib tenderness, no rash  NEURO: no focal sensory or motor deficits, CN 2-12 grossly intact  SKIN: warm, normal color, well perfused, no rash  PSYCH: normal affect

## 2023-12-03 NOTE — ED STATDOCS - OBJECTIVE STATEMENT
32 y/o male w/ no pertinent PMHx presents to the ED c/o atraumatic left sided chest/rib pain radiating to the back x few days. Pt states he intially thought he just slept weird however pain is persisting and not improved w/ Tylenol or Advil. Denies fevers, chills, n/v, sick contacts or recent travel. Pt notes breaking his ribs in the past, states it feels similar. Pt endorses pain being worse w/ movement. Smoker. NKDA. PCP: Dr. Vergara. 34 y/o male w/ no pertinent PMHx presents to the ED c/o atraumatic left sided chest/rib pain radiating to the back x few days. Pt states he intially thought he just slept weird however pain is persisting and not improved w/ Tylenol or Advil. Denies fevers, chills, n/v, sick contacts or recent travel. Pt notes breaking his ribs in the past, states it feels similar. Pt endorses pain being worse w/ movement. Smoker. NKDA. PCP: Dr. Vergara.

## 2023-12-03 NOTE — ED ADULT TRIAGE NOTE - NS ED TRIAGE AVPU SCALE
Left voicemail to N for peer to peer.   
Alert-The patient is alert, awake and responds to voice. The patient is oriented to time, place, and person. The triage nurse is able to obtain subjective information.

## 2023-12-03 NOTE — ED STATDOCS - PATIENT PORTAL LINK FT
You can access the FollowMyHealth Patient Portal offered by [facility] by registering at the following website: [email or web address]. By joining [employer]’s FollowMyHealth portal, you will also be able to view your health information using other applications (apps) compatible with our system. You can access the FollowMyHealth Patient Portal offered by [facility] by registering at the following website: [email or web address]. By joining [id number]’s FollowMyHealth portal, you will also be able to view your health information using other applications (apps) compatible with our system. You can access the FollowMyHealth Patient Portal offered by [facility] by registering at the following website: [email or web address]. By joining [employer]’s FollowMyHealth portal, you will also be able to view your health information using other applications (apps) compatible with our system.

## 2023-12-03 NOTE — ED ADULT TRIAGE NOTE - CHIEF COMPLAINT QUOTE
atraumatic left sided chest/back/ribs pain. originates anterior and radiates posterior. pain worsens upon leaning forwards and deep inspiration. 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 denies fevers. pt is smoker, chronic cough. pain is reproducible.

## 2023-12-03 NOTE — ED ADULT NURSE NOTE - NSFALLUNIVINTERV_ED_ALL_ED
Bed/Stretcher in lowest position, wheels locked, appropriate side rails in place/Call bell, personal items and telephone in reach/Instruct patient to call for assistance before getting out of bed/chair/stretcher/Non-slip footwear applied when patient is off stretcher/[place] to call system/Physically safe environment - no spills, clutter or unnecessary equipment/Purposeful proactive rounding/Room/bathroom lighting operational, light cord in reach Bed/Stretcher in lowest position, wheels locked, appropriate side rails in place/Call bell, personal items and telephone in reach/Instruct patient to call for assistance before getting out of bed/chair/stretcher/Non-slip footwear applied when patient is off stretcher/[place] to call system/Physically safe environment - no spills, clutter or unnecessary equipment/Purposeful proactive rounding/Room/bathroom lighting operational, light cord in reach Bed/Stretcher in lowest position, wheels locked, appropriate side rails in place/Call bell, personal items and telephone in reach/Instruct patient to call for assistance before getting out of bed/chair/stretcher/Non-slip footwear applied when patient is off stretcher/[place] to call system/Physically safe environment - no spills, clutter or unnecessary equipment/Purposeful proactive rounding/Room/bathroom lighting operational, light cord in reach

## 2023-12-03 NOTE — ED STATDOCS - NSFOLLOWUPINSTRUCTIONS_ED_ALL_ED_FT
Chest Pain    WHAT YOU NEED TO KNOW:    Chest pain can be caused by a range of conditions, from not serious to life-threatening. Chest pain can be a symptom of a digestive problem, such as acid reflux or a stomach ulcer. An anxiety attack or a strong emotion, such as anger, can also cause chest pain. Infection, inflammation, or a fracture in the bones or cartilage in your chest can cause pain or discomfort. Sometimes chest pain or pressure is caused by poor blood flow to your heart (angina). Chest pain may also be caused by life-threatening conditions such as a heart attack or blood clot in your lungs.     DISCHARGE INSTRUCTIONS:    Call 911 if:     You have any of the following signs of a heart attack:   Squeezing, pressure, or pain in your chest       and any of the following:   Discomfort or pain in your back, neck, jaw, stomach, or arm       Shortness of breath      Nausea or vomiting      Lightheadedness or a sudden cold sweat        Return to the emergency department if:     You have chest discomfort that gets worse, even with medicine.      You cough or vomit blood.       Your bowel movements are black or bloody.       You cannot stop vomiting, or it hurts to swallow.     Contact your healthcare provider if:     You have questions or concerns about your condition or care.        Medicines:     Medicines may be given to treat the cause of your chest pain. Examples include pain medicine, anxiety medicine, or medicines to increase blood flow to your heart.       Do not take certain medicines without asking your healthcare provider first. These include NSAIDs, herbal or vitamin supplements, or hormones (estrogen or progestin).       Take your medicine as directed. Contact your healthcare provider if you think your medicine is not helping or if you have side effects. Tell him or her if you are allergic to any medicine. Keep a list of the medicines, vitamins, and herbs you take. Include the amounts, and when and why you take them. Bring the list or the pill bottles to follow-up visits. Carry your medicine list with you in case of an emergency.    Follow up with your healthcare provider within 72 hours, or as directed: You may need to return for more tests to find the cause of your chest pain. You may be referred to a specialist, such as a cardiologist or gastroenterologist. Write down your questions so you remember to ask them during your visits.     Healthy living tips: The following are general healthy guidelines. If your chest pain is caused by a heart problem, your healthcare provider will give you specific guidelines to follow.    Do not smoke. Nicotine and other chemicals in cigarettes and cigars can cause lung and heart damage. Ask your healthcare provider for information if you currently smoke and need help to quit. E-cigarettes or smokeless tobacco still contain nicotine. Talk to your healthcare provider before you use these products.       Eat a variety of healthy, low-fat, low-salt foods. Healthy foods include fruits, vegetables, whole-grain breads, low-fat dairy products, beans, lean meats, and fish. Ask for more information about a heart healthy diet.      Drink plenty of water every day. Your body is made of mostly water. Water helps your body to control your temperature and blood pressure. Ask your healthcare provider how much water you should drink every day.      Ask about activity. Your healthcare provider will tell you which activities to limit or avoid. Ask when you can drive, return to work, and have sex. Ask about the best exercise plan for you.      Maintain a healthy weight. Ask your healthcare provider how much you should weigh. Ask him or her to help you create a weight loss plan if you are overweight.       Get the flu and pneumonia vaccines. All adults should get the influenza (flu) vaccine. Get it every year as soon as it becomes available. The pneumococcal vaccine is given to adults aged 65 years or older. The vaccine is given every 5 years to prevent pneumococcal disease, such as pneumonia.    If you have a stent:     Carry your stent card with you at all times.       Let all healthcare providers know that you have a stent.       Back Pain    WHAT YOU NEED TO KNOW:    Back pain is common. It can be caused by many conditions, such as arthritis or the breakdown of spinal discs. Your risk for back pain is increased by injuries, lack of activity, or repeated bending and twisting. You may feel sore or stiff on one or both sides of your back. The pain may spread to your buttocks or thighs.    DISCHARGE INSTRUCTIONS:    Return to the emergency department if:     You have pain, numbness, or weakness in one or both legs.      Your pain becomes so severe that you cannot walk.      You cannot control your urine or bowel movements.      You have severe back pain with chest pain.      You have severe back pain, nausea, and vomiting.      You have severe back pain that spreads to your side or genital area.    Contact your healthcare provider if:     You have back pain that does not get better with rest and pain medicine.      You have a fever.      You have pain that worsens when you are on your back or when you rest.      You have pain that worsens when you cough or sneeze.      You lose weight without trying.      You have questions or concerns about your condition or care.    Medicines:     NSAIDs help decrease swelling and pain. This medicine is available with or without a doctor's order. NSAIDs can cause stomach bleeding or kidney problems in certain people. If you take blood thinner medicine, always ask your healthcare provider if NSAIDs are safe for you. Always read the medicine label and follow directions.      Acetaminophen decreases pain and fever. It is available without a doctor's order. Ask how much to take and how often to take it. Follow directions. Read the labels of all other medicines you are using to see if they also contain acetaminophen, or ask your doctor or pharmacist. Acetaminophen can cause liver damage if not taken correctly. Do not use more than 4 grams (4,000 milligrams) total of acetaminophen in one day.       Muscle relaxers help decrease muscle spasms and back pain.      Prescription pain medicine may be given. Ask your healthcare provider how to take this medicine safely. Some prescription pain medicines contain acetaminophen. Do not take other medicines that contain acetaminophen without talking to your healthcare provider. Too much acetaminophen may cause liver damage. Prescription pain medicine may cause constipation. Ask your healthcare provider how to prevent or treat constipation.       Take your medicine as directed. Contact your healthcare provider if you think your medicine is not helping or if you have side effects. Tell him or her if you are allergic to any medicine. Keep a list of the medicines, vitamins, and herbs you take. Include the amounts, and when and why you take them. Bring the list or the pill bottles to follow-up visits. Carry your medicine list with you in case of an emergency.    How to manage your back pain:     Apply ice on your back for 15 to 20 minutes every hour or as directed. Use an ice pack, or put crushed ice in a plastic bag. Cover it with a towel before you apply it to your skin. Ice helps prevent tissue damage and decreases pain.      Apply heat on your back for 20 to 30 minutes every 2 hours for as many days as directed. Heat helps decrease pain and muscle spasms.      Stay active as much as you can without causing more pain. Bed rest could make your back pain worse. Avoid heavy lifting until your pain is gone.      Go to physical therapy as directed. A physical therapist can teach you exercises to help improve movement and strength, and to decrease pain.    Follow up with your healthcare provider in 2 weeks, or as directed: Write down your questions so you remember to ask them during your visits.

## 2023-12-03 NOTE — ED ADULT NURSE NOTE - NS ED NURSE LEVEL OF CONSCIOUSNESS ORIENTATION
Patient called back re: test results. She still needs the Glucose reading and the total cholesterol findings. Please call her back @ [phone]. She is avail all day.   Oriented - self; Oriented - place; Oriented - time

## 2023-12-03 NOTE — ED STATDOCS - CLINICAL SUMMARY MEDICAL DECISION MAKING FREE TEXT BOX
33-year-old male presenting with atraumatic left-sided rib pain.  Symptoms worse with movement and palpation.  No overlying skin changes, no rash, no crepitus.  Likely soft tissue or MSK related pain, low suspicion for fracture or pulmonary pathology.  Plan for pain control, x-ray, anticipate discharge with PMD follow-up.

## 2024-01-11 NOTE — ED ADULT TRIAGE NOTE - MEANS OF ARRIVAL
Subjective   Patient ID: Maxwell Villatoro is a 79 y.o. male who presents for Abdominal Pain (EST Abdominal Pain, s/p hernia repairs).  HPI  This is a pleasant gentleman here today to tell me about some of his abdominal complaints.  The patient states that he has this discomfort across his mid abdomen almost constantly.  It does not wake him up at night sometimes twisting or turning seems to make it worse.  It is not any worse with eating.  Occasionally if he is constipated that makes it worse as well.  He had a bilateral laparoscopic inguinal hernia repair in the past and also an open umbilical hernia repair.  These operations were done at [facility].  Review of Systems  10 point review is otherwise negative    Social history he does not smoke or drink alcohol    Family history is noncontributory  Objective   Physical Exam  Head is normocephalic atraumatic eyes extraocular movements are intact he does wear glasses.  Lungs are clear bilaterally.  Heart is regular rate and rhythm.  Abdomen is flat and soft he has some fullness just to the right of the umbilicus.  That is where he is a little sore.  The left side does not feel as full and is not tender there.  There is a small infraumbilical incision.  Extremities do not reveal any gross deformities.  Assessment/Plan highly likely that the patient has a recurrent umbilical hernia as the reason for his discomfort and also the physical exam findings.  I am recommending a CT scan of the abdomen and pelvis to assess this.  He will follow-up with me afterwards.           Alicia Wilson MD 01/11/24 12:04 PM    ambulatory

## 2024-03-26 NOTE — ED PROCEDURE NOTE - NS ED PERI NEURO NEG
Pre-application: Motor, sensory, and vascular responses intact in the injured extremity./Post-application: Motor, sensory, and vascular responses intact in the injured extremity./The patient/caregiver verbalized understanding of how to care for the injured extremity with splint [FreeTextEntry1] : 80F previous left GSV venaseal. Here with bilateral leg pain, Left worse than right. Mainly in the thigh and behind the knee.  3/22/24: patient returns with similar complaints, pain is persistent and unchanged. Worst first thing in the morning.

## 2024-04-15 NOTE — ASU PATIENT PROFILE, ADULT - PATIENT KNOW
[facility] - Med Surg  Infectious Disease  Progress Note    Patient Name: Miguel Moore  MRN: [id number]  Admission Date: 3/20/2024  Length of Stay: 26 days  Attending Physician: Hipolito Mooney MD  Primary Care Provider: Raciel Raymond MD    Isolation Status: No active isolations  Assessment/Plan:      ID  * Cellulitis/Abscess  of right foot/ankle  I independently reviewed patient's lab work and images as documented. 70 year old man with ESRD on HD, DM and CVA with R sided weakness admitted on 3/21 for fatigue, hyperkalemia, found to have Afib with RVR. CT ankle from 3/28 suggestive of cellulitis, no evidence of osteomyelitis, pt had new drainage from R ankle and was found to have an abscess. S/p I&D 4/4 and 4/6 with OR cx with Ecoli. Operative noted on 4/6 notable for necrotic tissue around distal fibula. Interval improvement in fevers since surgery; however, has leukocytosis. Of note, pt previously had MRSA infection of R ankle in 2021 - empiric MRSA coverage added. He has elevated liver enzymes - unclear etiology at this time, he is on multiple medications that can cause hepatotoxicity (amio, atorvastatin, abx, etc). Blcx so far unrevealing.     Recommendations  - continue ceftriaxone 2g iv daily  - continue vancomycin goal trough 15-20 mcg/ml, dosing per pharmacy   - follow up wound care/ortho recs, consider repeat CT of R ankle if concerns for infection on next wound vac exchange/dressing change          Thank you for your consult. I will follow-up with patient. Please contact us if you have any additional questions. Above d/w primary team.       Laney Underwood MD  Infectious Disease  [facility] - Med Surg    Subjective:     Principal Problem:Cellulitis of right foot    HPI: 71 yo male with ESRD on HD, DM and CVA with R sided weakness admitted for fatigue, hyperkalemia, found to have Afib with RVR. ID consulted for fevers. Admission course notable for R ankle cellulitis, CT with extensive subcutaneous  edema, no focal fluid collection or OM seen. He spiked a fever on 3/27 and 3/28 - blood cx obtained ngtd. TTE on 3/20 without IE. Pt is currently on vancomycin and meropenem. Prior to admission, pt reported he had pain in R ankle, denied fevers chills, sick contacts. He states that since he's been here, he has had some loose stools and RUE swelling. Reported hives with PCN in ithe past, tolerated carb/cephs.   Interval History: No fevers documented overnight.   Pt reports tolerating abx without issues, states R leg hurts sometimes.     Review of Systems   Constitutional:  Negative for chills and fever.   All other systems reviewed and are negative.    Objective:     Vital Signs (Most Recent):  Temp: 98.2 °F (36.8 °C) (04/15/24 0715)  Pulse: 64 (04/15/24 0725)  Resp: 18 (04/15/24 0715)  BP: (!) 115/56 (04/15/24 0715)  SpO2: 100 % (04/15/24 0715) Vital Signs (24h Range):  Temp:  [97 °F (36.1 °C)-98.4 °F (36.9 °C)] 98.2 °F (36.8 °C)  Pulse:  [64-68] 64  Resp:  [17-20] 18  SpO2:  [99 %-100 %] 100 %  BP: (105-128)/(53-60) 115/56     Weight: 90.4 kg (199 lb 4.7 oz)  Body mass index is 30.3 kg/m².    Estimated Creatinine Clearance: 9.6 mL/min (A) (based on SCr of 7.9 mg/dL (H)).     Physical Exam  Constitutional:       General: He is not in acute distress.     Appearance: He is not ill-appearing or toxic-appearing.   Pulmonary:      Effort: Pulmonary effort is normal. No respiratory distress.   Abdominal:      General: There is no distension.      Palpations: Abdomen is soft.      Tenderness: There is no abdominal tenderness. There is no guarding.   Musculoskeletal:      Right lower leg: No edema.      Left lower leg: No edema.   Skin:     General: Skin is warm and dry.      Comments: RLE wrapped, wound vac present   Neurological:      Mental Status: He is alert.          Significant Labs:   Microbiology Results (last 7 days)       Procedure Component Value Units Date/Time    Blood culture [[id number]] Collected: 04/13/24  1237    Order Status: Completed Specimen: Blood Updated: 04/14/24 1503     Blood Culture, Routine No Growth to date      No Growth to date            Significant Imaging: I have reviewed all pertinent imaging results/findings within the past 24 hours.   yes

## 2024-05-16 NOTE — ED STATDOCS - CLINICAL SUMMARY MEDICAL DECISION MAKING FREE TEXT BOX
Female patient with asthma, mild wheezing  nad  comfortable, speaking in full sentences  discussed smoking cessation,   will rx steroids, and discussed need for follow up

## 2024-12-07 NOTE — ED ADULT NURSE NOTE - SUICIDE SCREENING QUESTION 1
Interventional Radiology Progress Note    Patient: Ryan Parsons Date: 2024  : 1934 Attending: Chapo Francis,*  Izzy Ayala MD   89 year old male     Diagnosis/Comorbidities/Complications:  Patient Active Problem List   Diagnosis    Hypercholesterolemia    Gastroesophageal reflux disease    Moderate persistent asthma (CMD)    Iron deficiency anemia    Benign prostatic hyperplasia    Imbalance    Bilateral leg weakness    Senile purpura (CMD)    Environmental allergies    Bilateral sensorineural hearing loss    Gait apraxia    Peripheral edema    Severe aortic stenosis    CAD in native artery    Angina at rest (CMD)    SOB (shortness of breath)    Abdominal aortic aneurysm (CMD)    S/P TAVR (transcatheter aortic valve replacement)    Impaired functional mobility, balance, and endurance    Anemia, unspecified type    Heart failure (CMD)    Acute on chronic diastolic heart failure  (CMD)    Chronic diastolic heart failure  (CMD)    Neck pain    Pneumonia of right lower lobe due to infectious organism     History: 89 year old male who initially presented to [facility] ED (24) with SOB and cough. Known hx of chronic megacolon. CT C/A/P () with worsening megacolon - now severely dilated/distended, with noted prominent elevation of right hemidiaphragm likely d/t mass effect from significant gaseous distention of colon and moderate-large partially loculated RIGHT pleural effusion. Required emergent intubation in ED and admitted to SICU. Underwent colonoscopy () with decompression via rectal tube. IR consulted for chest tube placement.     Reason for Follow-up:  -- 24: s/p 18Fr RIGHT chest tube placement with Dr Gross for pleural effusion secondary to mass effect from sonu colon   -- 24: tPA/dornase started per ICU    Subjective: Unable to obtain - intubated and sedated.     Physical Exam:  General: NAD, lying in bed.   Neuro: Sedated, but following some commands. No pain  behaviors.  HEENT: Atraumatic, normocephalic.  Neck: Supple, trachea midline.  Chest: Mechanically ventilated via ETT. R chest tube to -20cm wall suction, dark sanguinous drainage in atrium. No airleak, no crepitus. Non-tender to palpation, dressing C/D/I.   Abdomen: Soft, non distended.   Extremities: Warm, perfused.   Skin: Scattered ecchymosis. Warm, dry, intact.    Vital Last Value 24 Hour Range   Temperature 99.1 °F (37.3 °C) (12/07/24 0700) Temp  Min: 96.5 °F (35.8 °C)  Max: 99.3 °F (37.4 °C)   Pulse 97 (12/07/24 0700) Pulse  Min: 68  Max: 152   Respiratory 13 (12/07/24 0700) Resp  Min: 13  Max: 34   Non-Invasive  Blood Pressure 117/50 (12/05/24 0621) No data recorded   Arterial  Blood Pressure   No data recorded   Pulse Oximetry 99 % (12/07/24 0717) SpO2  Min: 87 %  Max: 100 %     Laboratory Results:  Recent Labs     12/05/24  0400 12/05/24  1130 12/05/24  2213 12/06/24  0442 12/06/24  1055 12/06/24  1233 12/06/24  1954 12/07/24  0424   SODIUM 133*  --   --  133*  --   --   --  134*   POTASSIUM 3.1*  --  3.7 3.7  --   --   --  3.0*   BUN 64*  --   --  66*  --   --   --  72*   CREATININE 1.49*  --   --  1.43*  --   --   --  1.59*   WBC 20.3*  --   --  33.8*  --   --   --  34.9*   HCT 20.5*   < >  --  23.4*  --  24.0* 22.1* 21.6*   HGB 6.7*   < >  --  8.0*  --  7.8* 7.2* 6.9*   *  --   --  130*  --   --   --  101*   INR  --   --   --   --  1.2  --   --   --    GLUCOSE 212*  --   --  226*  --   --   --  186*   MG 2.7*  --   --   --   --   --   --   --     < > = values in this interval not displayed.     Above labs were reviewed.    Scheduled Medications:  Current Facility-Administered Medications   Medication Dose Route Frequency Provider Last Rate Last Admin    hydroCORTisone (Solu-CORTEF) PF injection 50 mg  50 mg Intravenous BID Dipika Franco, NP   50 mg at 12/06/24 2101    bumetanide (BUMEX) 12.5 mg/50 mL infusion adult or ped > 15 kg (0.25 mg/mL)  1 mg/hr Intravenous Continuous Dipika Franco  LINNETTE ASHLEY 4 mL/hr at 12/06/24 2359 1 mg/hr at 12/06/24 2359    insulin regular (human) (HumuLIN R, NovoLIN R) correction Dose   Subcutaneous 4 times per day Dipika Franco NP   3 Units at 12/07/24 0542    fentaNYL (SUBLIMAZE) injection 12.5 mcg  12.5 mcg Intravenous Q1H PRN Dipika Franco NP        [Held by provider] digoxin (LANOXIN) injection 250 mcg  250 mcg Intravenous Once Dipika Franco NP        AMIODarone (CORDARONE/NEXTERONE) 360 mg in dextrose 200 mL infusion  0.5 mg/min Intravenous Continuous Joshua Carmichael NP 16.7 mL/hr at 12/07/24 0200 0.5 mg/min at 12/07/24 0200    ampicillin-sulbactam (UNASYN) 1.5 g in sodium chloride 0.9 % 100 mL IVPB  1.5 g Intravenous 4 times per day Joshua Carmichael  mL/hr at 12/07/24 0509 1.5 g at 12/07/24 0509    polyethylene glycol (MIRALAX) packet 17 g  17 g Per OG Tube 4x Daily Jeffrey King MD   17 g at 12/06/24 2101    docusate sodium (COLACE) 50 MG/5ML liquid 100 mg  100 mg Per OG Tube BID Jeffrey King MD   100 mg at 12/06/24 2101    sennosides (SENOKOT) 8.8 MG/5ML syrup 10 mL  10 mL Per OG Tube Nightly Joshua Carmichael NP   10 mL at 12/06/24 2111    [Held by provider] heparin (porcine) injection 5,000 Units  5,000 Units Subcutaneous 3 times per day Joshua Carmichael NP   5,000 Units at 12/05/24 1418    NORepinephrine (LEVOPHED) 8 mg/250 mL in dextrose 5 % infusion  0-10 mcg/min Intravenous Continuous Joshua Carmichael NP   Stopped at 12/07/24 0620    vasopressin (VASOSTRICT) 20 unit/100 mL dextrose 5 % infusion  0-0.04 Units/min Intravenous Continuous Joshua Carmichael NP 12 mL/hr at 12/07/24 0425 0.04 Units/min at 12/07/24 0425    [Held by provider] aspirin (ECOTRIN) enteric coated tablet 81 mg  81 mg Oral Daily Margaret Kaba MD        [Held by provider] atorvastatin (LIPITOR) tablet 40 mg  40 mg Oral Nightly Margaret Kaba MD        sodium chloride 0.9 % injection 10 mL  10 mL Intravenous PRN Margaret Kaba MD         sodium chloride 0.9 % injection 2 mL  2 mL Intracatheter 2 times per day Margaret Kaba MD   2 mL at 12/06/24 2102    ipratropium-albuterol (DUONEB) 0.5-2.5 (3) MG/3ML nebulizer solution 3 mL  3 mL Nebulization Q4H Resp Margaret Kaba MD   3 mL at 12/07/24 0717    famotidine (PEPCID) injection 20 mg  20 mg Intravenous Daily Margaret Kaba MD   20 mg at 12/06/24 1019    chlorhexidine gluconate (PERIDEX) 0.12 % solution 15 mL  15 mL Swish & Spit 2 times per day Margaret Kaba MD   15 mL at 12/06/24 2102    And    chlorhexidine gluconate (PERIDEX) 0.12 % solution 15 mL  15 mL Swish & Spit PRN Margaret Kaba MD        CARBOXYMethylcellulose (REFRESH TEARS) 0.5 % ophthalmic solution 1 drop  1 drop Both Eyes 6 times per day Margaret Kaba MD   1 drop at 12/07/24 0405    CARBOXYMethylcellulose (REFRESH TEARS) 0.5 % ophthalmic solution 1 drop  1 drop Both Eyes PRN Margaret Kaba MD        dextrose 50 % injection 25 g  25 g Intravenous PRN Jeffrey King MD        dextrose 50 % injection 12.5 g  12.5 g Intravenous PRN Jeffrey King MD        glucagon (GLUCAGEN) injection 1 mg  1 mg Intramuscular PRN Jeffrey King MD        dextrose (GLUTOSE) 40 % gel 15 g  15 g Per OG Tube PRN Jeffrey King MD        dextrose (GLUTOSE) 40 % gel 30 g  30 g Per OG Tube PRN Jeffrey King MD        influenza virus trivalent vaccine inactivated (FLUZONE HIGH-DOSE) injection 0.5 mL  0.5 mL Intramuscular Prior to discharge Margaret Kaba MD        sodium citrate anticoagulant 4 % flush 3 mL  3 mL Intracatheter PRN Margaret Kaba MD   3 mL at 11/30/24 7449     Continuous Infusions:  Current Facility-Administered Medications   Medication Dose Route Frequency Provider Last Rate Last Admin    bumetanide (BUMEX) 12.5 mg/50 mL infusion adult or ped > 15 kg (0.25 mg/mL)  1 mg/hr Intravenous Continuous Dipika Franco, NP 4 mL/hr at 12/06/24 2351 1 mg/hr at 12/06/24 2353    AMIODarone  (CORDARONE/NEXTERONE) 360 mg in dextrose 200 mL infusion  0.5 mg/min Intravenous Continuous Joshua Carmichael, NP 16.7 mL/hr at 12/07/24 0200 0.5 mg/min at 12/07/24 0200    NORepinephrine (LEVOPHED) 8 mg/250 mL in dextrose 5 % infusion  0-10 mcg/min Intravenous Continuous Joshua Carmichael, NP   Stopped at 12/07/24 0620    vasopressin (VASOSTRICT) 20 unit/100 mL dextrose 5 % infusion  0-0.04 Units/min Intravenous Continuous Joshua Carmichael, NP 12 mL/hr at 12/07/24 0425 0.04 Units/min at 12/07/24 0425     Intake/Output:  Date 12/06/24 0700 - 12/07/24 0659 12/07/24 0700 - 12/08/24 0659   Shift 4257-9715 3752-8196 6060-7275 24 Hour Total 6473-9555 1660-0508 1133-5122 24 Hour Total   INTAKE   P.O. 0 0 0 0       I.V. 301.7 278.1 320.5 900.3       NG/        IV Piggyback 100 200 200 500       Shift Total 910.7 603.1 990.5 2504.3       OUTPUT   Urine 121  115   115   Drains 0 50 75 125         GI Tube Output (mL) (GI Tube 11/30/24 Orogastric Oral cavity) 0 50 75 125       Stool 300 350  650       Chest Tube 200 120 110 430         Output (mL) (Chest Tube 12/01/24 #1 Right Lateral) 200 120 110 430       Shift Total 621 1080 1100 2801 115   115   Weight (kg) 66.4 66.4 65.5 65.5 65.5 65.5 65.5 65.5       Imaging:    --12/5/25: CXR  IMPRESSION:  Slight repositioning of the tip of the right chest tube. Question a tiny  pneumothorax at the right costophrenic angle. Attention on follow-ups  Exam is otherwise not significantly changed.    -- 12/4/24: CXR  IMPRESSION:  1.  ETT terminates 2.5 cm from the talat. Otherwise, support apparatuses appear in stable position.  2.  Persistent dilated gas-filled loops of colon.  3.  Unchanged appearance of right pleural effusion with adjacent  atelectasis when compared to 1 day prior.    -- 12/3/24: CXR  IMPRESSION: Endotracheal tube terminates 2.7 cm above the talat. Remainder of the support devices are in similar position. 5 mm radiopaque structure projects  over the mid neck base, new from prior. It is possible this is external to the patient. Recommend correlation with direct inspection.     Pacer pads project over the left chest. Moderate elevation of right  hemidiaphragm. Mild right basilar atelectasis. Small right pleural  effusion. No pneumothorax. Gas-filled loop of colon is present underlying the right and left hemidiaphragms. Overlying cardiac monitoring leads.    -- 12/2/24: CXR  IMPRESSION:  Significantly improved loculated right lateral pleural fluid with chest  tube in place. No new/acute finding.    -- 11/30/24: CT C/A/P  IMPRESSION:  Severe dilatation/distention of the parts of the colon with relatively  normal caliber of the distal descending/rectosigmoid colon. Moderate to severe proximal colonic stool burden. No convincing evidence of distal obstructing etiology. Overall findings appear worsened since the previous study from March, 2024. Differential considerations to include colonic ileus/pseudoobstruction. Clinical correlation is recommended.     Prominent elevation of the right hemidiaphragm, likely due to mass effect from the significant gaseous distention of the colon. Additional mass effect and displacement of multiple intra-abdominal structures as detailed above.     Moderate to large partially loculated right pleural effusion, new since the previous study with near complete opacification of the right hemithorax. Significant debris noted in the right mainstem and lower lobe bronchi with resultant atelectasis/collapse of the right lung and minimal residual aeration of the parts of the right lung as detailed above. Pulmonary assessment is warranted for possible bronchoscopy and to exclude underlying obstructing lesion.     Progressive fusiform aneurysmal dilatation of the infrarenal abdominal aorta, now measuring up to 5.6 cm, previously 5.2 centers.     Multiple additional chronic and incidental findings as detailed  above.      Plan/Recommendations:  # Loculated RIGHT pleural [id number]/1/24: s/p 18Fr RIGHT chest tube placement s/p 3 days (12/2-12/4) of lytics.  12/6/24 CT imaging personally reviewed and staff with Dr. Miramontes. The loculated right pleural effusion has largely resolved since 6 doses of lytics.   H/H slight decreased from 7.2 to 6.9. repeat POC Hgb 7.5.  Worsening leukocytosis 20.3 > 33.8 > 34.9 over the past 3 days. Unlikely related to empyema given near complete fluid evacuation on 12/6 CT. More likely related to megacolon.   Recommend chest tube to remain in place until patient clinically improves or CT outputs are < 200cc over a 24 hour period.   Output: 450cc/last 24hrs, dark sanguinous drainage  Continue to monitor chest tube output quality and quantity closely.    Maintain to -20cm  Record output Q8h, even if 0cc. Dressing change daily and PRN.  Daily CXR while chest tube in place   GI following  Surgery following - will require eventual surgical intervention once clinical acuity improved    No additional IR interventions warranted at this time. IR will continue to follow. Possible tube removal once CT outputs are < 200 cc/24 hours.     Arvin Shi PA-C  Vascular/Interventional Radiology  [facility]  Available via Epic Secure Chat [phone] M-F, S [phone]  12/7/2024  7:43 AM    The majority of MDM for this encounter was performed by myself in collaboration with Dr. Miramontes . A total of 35 minutes was spent which included obtaining subjective information, performing a medically pertinent physical exam, chart review of imaging, notes, labs, performing EHR documentation, and care coordination    Please page the on-call IR provider for urgent needs outside of above hours.        No

## 2025-01-31 ENCOUNTER — EMERGENCY (EMERGENCY)
Facility: HOSPITAL | Age: 35
LOS: 0 days | Discharge: HOME | End: 2025-01-31
Attending: EMERGENCY MEDICINE
Payer: COMMERCIAL

## 2025-01-31 VITALS
TEMPERATURE: 98 F | RESPIRATION RATE: 18 BRPM | HEART RATE: 89 BPM | DIASTOLIC BLOOD PRESSURE: 80 MMHG | SYSTOLIC BLOOD PRESSURE: 128 MMHG | OXYGEN SATURATION: 96 %

## 2025-01-31 VITALS
OXYGEN SATURATION: 94 % | DIASTOLIC BLOOD PRESSURE: 90 MMHG | HEART RATE: 84 BPM | TEMPERATURE: 98 F | SYSTOLIC BLOOD PRESSURE: 134 MMHG | RESPIRATION RATE: 18 BRPM

## 2025-01-31 DIAGNOSIS — G47.33 OBSTRUCTIVE SLEEP APNEA (ADULT) (PEDIATRIC): ICD-10-CM

## 2025-01-31 DIAGNOSIS — Z98.890 OTHER SPECIFIED POSTPROCEDURAL STATES: Chronic | ICD-10-CM

## 2025-01-31 DIAGNOSIS — F41.9 ANXIETY DISORDER, UNSPECIFIED: ICD-10-CM

## 2025-01-31 DIAGNOSIS — R19.7 DIARRHEA, UNSPECIFIED: ICD-10-CM

## 2025-01-31 DIAGNOSIS — R51.9 HEADACHE, UNSPECIFIED: ICD-10-CM

## 2025-01-31 DIAGNOSIS — J45.909 UNSPECIFIED ASTHMA, UNCOMPLICATED: ICD-10-CM

## 2025-01-31 DIAGNOSIS — R11.2 NAUSEA WITH VOMITING, UNSPECIFIED: ICD-10-CM

## 2025-01-31 DIAGNOSIS — J11.1 INFLUENZA DUE TO UNIDENTIFIED INFLUENZA VIRUS WITH OTHER RESPIRATORY MANIFESTATIONS: ICD-10-CM

## 2025-01-31 LAB
ALBUMIN SERPL ELPH-MCNC: 3.8 G/DL — SIGNIFICANT CHANGE UP (ref 3.3–5)
ALP SERPL-CCNC: 85 U/L — SIGNIFICANT CHANGE UP (ref 40–120)
ALT FLD-CCNC: 63 U/L — SIGNIFICANT CHANGE UP (ref 12–78)
ANION GAP SERPL CALC-SCNC: 2 MMOL/L — LOW (ref 5–17)
AST SERPL-CCNC: 37 U/L — SIGNIFICANT CHANGE UP (ref 15–37)
BASOPHILS # BLD AUTO: 0.05 K/UL — SIGNIFICANT CHANGE UP (ref 0–0.2)
BASOPHILS NFR BLD AUTO: 0.5 % — SIGNIFICANT CHANGE UP (ref 0–2)
BILIRUB SERPL-MCNC: 0.7 MG/DL — SIGNIFICANT CHANGE UP (ref 0.2–1.2)
BUN SERPL-MCNC: 18 MG/DL — SIGNIFICANT CHANGE UP (ref 7–23)
CALCIUM SERPL-MCNC: 8.8 MG/DL — SIGNIFICANT CHANGE UP (ref 8.5–10.1)
CHLORIDE SERPL-SCNC: 105 MMOL/L — SIGNIFICANT CHANGE UP (ref 96–108)
CO2 SERPL-SCNC: 27 MMOL/L — SIGNIFICANT CHANGE UP (ref 22–31)
CREAT SERPL-MCNC: 0.85 MG/DL — SIGNIFICANT CHANGE UP (ref 0.5–1.3)
EGFR: 118 ML/MIN/1.73M2 — SIGNIFICANT CHANGE UP
EOSINOPHIL # BLD AUTO: 0.16 K/UL — SIGNIFICANT CHANGE UP (ref 0–0.5)
EOSINOPHIL NFR BLD AUTO: 1.6 % — SIGNIFICANT CHANGE UP (ref 0–6)
FLUAV AG NPH QL: DETECTED
FLUBV AG NPH QL: SIGNIFICANT CHANGE UP
GLUCOSE SERPL-MCNC: 95 MG/DL — SIGNIFICANT CHANGE UP (ref 70–99)
HCT VFR BLD CALC: 48.4 % — SIGNIFICANT CHANGE UP (ref 39–50)
HGB BLD-MCNC: 16.4 G/DL — SIGNIFICANT CHANGE UP (ref 13–17)
IMM GRANULOCYTES NFR BLD AUTO: 0.3 % — SIGNIFICANT CHANGE UP (ref 0–0.9)
LYMPHOCYTES # BLD AUTO: 1.69 K/UL — SIGNIFICANT CHANGE UP (ref 1–3.3)
LYMPHOCYTES # BLD AUTO: 16.4 % — SIGNIFICANT CHANGE UP (ref 13–44)
MCHC RBC-ENTMCNC: 29.8 PG — SIGNIFICANT CHANGE UP (ref 27–34)
MCHC RBC-ENTMCNC: 33.9 G/DL — SIGNIFICANT CHANGE UP (ref 32–36)
MCV RBC AUTO: 87.8 FL — SIGNIFICANT CHANGE UP (ref 80–100)
MONOCYTES # BLD AUTO: 1.15 K/UL — HIGH (ref 0–0.9)
MONOCYTES NFR BLD AUTO: 11.2 % — SIGNIFICANT CHANGE UP (ref 2–14)
NEUTROPHILS # BLD AUTO: 7.23 K/UL — SIGNIFICANT CHANGE UP (ref 1.8–7.4)
NEUTROPHILS NFR BLD AUTO: 70 % — SIGNIFICANT CHANGE UP (ref 43–77)
PLATELET # BLD AUTO: 275 K/UL — SIGNIFICANT CHANGE UP (ref 150–400)
POTASSIUM SERPL-MCNC: 4 MMOL/L — SIGNIFICANT CHANGE UP (ref 3.5–5.3)
POTASSIUM SERPL-SCNC: 4 MMOL/L — SIGNIFICANT CHANGE UP (ref 3.5–5.3)
PROT SERPL-MCNC: 8.1 GM/DL — SIGNIFICANT CHANGE UP (ref 6–8.3)
RBC # BLD: 5.51 M/UL — SIGNIFICANT CHANGE UP (ref 4.2–5.8)
RBC # FLD: 12.4 % — SIGNIFICANT CHANGE UP (ref 10.3–14.5)
RSV RNA NPH QL NAA+NON-PROBE: SIGNIFICANT CHANGE UP
SARS-COV-2 RNA SPEC QL NAA+PROBE: SIGNIFICANT CHANGE UP
SODIUM SERPL-SCNC: 134 MMOL/L — LOW (ref 135–145)
WBC # BLD: 10.31 K/UL — SIGNIFICANT CHANGE UP (ref 3.8–10.5)
WBC # FLD AUTO: 10.31 K/UL — SIGNIFICANT CHANGE UP (ref 3.8–10.5)

## 2025-01-31 PROCEDURE — 0241U: CPT

## 2025-01-31 PROCEDURE — 36415 COLL VENOUS BLD VENIPUNCTURE: CPT

## 2025-01-31 PROCEDURE — 96374 THER/PROPH/DIAG INJ IV PUSH: CPT

## 2025-01-31 PROCEDURE — 99284 EMERGENCY DEPT VISIT MOD MDM: CPT | Mod: 25

## 2025-01-31 PROCEDURE — 99284 EMERGENCY DEPT VISIT MOD MDM: CPT

## 2025-01-31 PROCEDURE — 80053 COMPREHEN METABOLIC PANEL: CPT

## 2025-01-31 PROCEDURE — 85025 COMPLETE CBC W/AUTO DIFF WBC: CPT

## 2025-01-31 RX ORDER — ONDANSETRON 4 MG/1
4 TABLET ORAL ONCE
Refills: 0 | Status: COMPLETED | OUTPATIENT
Start: 2025-01-31 | End: 2025-01-31

## 2025-01-31 RX ORDER — SODIUM CHLORIDE 9 MG/ML
1000 INJECTION, SOLUTION INTRAMUSCULAR; INTRAVENOUS; SUBCUTANEOUS ONCE
Refills: 0 | Status: COMPLETED | OUTPATIENT
Start: 2025-01-31 | End: 2025-01-31

## 2025-01-31 RX ADMIN — SODIUM CHLORIDE 1000 MILLILITER(S): 9 INJECTION, SOLUTION INTRAMUSCULAR; INTRAVENOUS; SUBCUTANEOUS at 14:31

## 2025-01-31 RX ADMIN — ONDANSETRON 4 MILLIGRAM(S): 4 TABLET ORAL at 14:31

## 2025-01-31 NOTE — ED STATDOCS - CLINICAL SUMMARY MEDICAL DECISION MAKING FREE TEXT BOX
33 y/o M  presents to ED with vomiting and diarrhea for a few days that has improved. Endorses Cough , congestion, decreased PO intake. Pt afebrile. Vitals stable. Abd soft, nt. Check labs IV fluids Zofran.

## 2025-01-31 NOTE — ED STATDOCS - PROGRESS NOTE DETAILS
pt presented to the ER for N/V /D headache, with little PO intact for the past 4 days    body aches and not feeling well    Discussed the results with the patient informed him of positive Flu and care plan of BRAT diet, hydration, tylenol and or ibuprofen

## 2025-01-31 NOTE — ED STATDOCS - OBJECTIVE STATEMENT
35 y/o M with PMHx of anxiety, asthma, LEEROY, vertigo presents to ED c/o headache, nausea, vomiting, diarrhea x5 days. Endorses decreased PO intake. Denies vomiting and diarrhea today. Endorses chills the past 2 days

## 2025-01-31 NOTE — ED ADULT NURSE NOTE - NSICDXPASTMEDICALHX_GEN_ALL_CORE_FT
Detail Level: Generalized Detail Level: Zone Detail Level: Simple PAST MEDICAL HISTORY:  Anxiety     Asthma     LEEROY (obstructive sleep apnea)     Vertigo

## 2025-01-31 NOTE — ED STATDOCS - PATIENT PORTAL LINK FT
You can access the FollowMyHealth Patient Portal offered by [facility] by registering at the following website: [email or web address]. By joining [facility]’s FollowMyHealth portal, you will also be able to view your health information using other applications (apps) compatible with our system.

## 2025-01-31 NOTE — ED STATDOCS - ATTENDING APP SHARED VISIT CONTRIBUTION OF CARE
I,Amari Velasquez MD,  performed the initial face to face bedside interview with this patient regarding history of present illness, review of symptoms and relevant past medical, social and family history.  I completed an independent physical examination.  I was the initial provider who evaluated this patient. I have signed out the follow up of any pending tests (i.e. labs, radiological studies) to the ACP.  I have communicated the patient’s plan of care and disposition with the ACP.  The history, relevant review of systems, past medical and surgical history, medical decision making, and physical examination was documented by the scribe in my presence and I attest to the accuracy of the documentation.

## 2025-01-31 NOTE — ED STATDOCS - NSFOLLOWUPINSTRUCTIONS_ED_ALL_ED_FT
rest  BRAT diet, bananas, rice, apple/applesuce, tea/toast  stay hydrated with gatorade and or pedilyte  tylenol and or ibuprofen 2 tablets every 6 hours

## 2025-05-30 ENCOUNTER — EMERGENCY (EMERGENCY)
Facility: HOSPITAL | Age: 35
LOS: 0 days | Discharge: HOME | End: 2025-05-30
Attending: EMERGENCY MEDICINE
Payer: COMMERCIAL

## 2025-05-30 VITALS
OXYGEN SATURATION: 94 % | DIASTOLIC BLOOD PRESSURE: 80 MMHG | TEMPERATURE: 98 F | HEART RATE: 104 BPM | WEIGHT: 277.78 LBS | SYSTOLIC BLOOD PRESSURE: 125 MMHG | RESPIRATION RATE: 18 BRPM

## 2025-05-30 DIAGNOSIS — J45.901 UNSPECIFIED ASTHMA WITH (ACUTE) EXACERBATION: ICD-10-CM

## 2025-05-30 DIAGNOSIS — R06.02 SHORTNESS OF BREATH: ICD-10-CM

## 2025-05-30 DIAGNOSIS — Z98.890 OTHER SPECIFIED POSTPROCEDURAL STATES: Chronic | ICD-10-CM

## 2025-05-30 DIAGNOSIS — R06.2 WHEEZING: ICD-10-CM

## 2025-05-30 DIAGNOSIS — R00.0 TACHYCARDIA, UNSPECIFIED: ICD-10-CM

## 2025-05-30 LAB
FLUAV AG NPH QL: SIGNIFICANT CHANGE UP
FLUBV AG NPH QL: SIGNIFICANT CHANGE UP
RSV RNA NPH QL NAA+NON-PROBE: SIGNIFICANT CHANGE UP
SARS-COV-2 RNA SPEC QL NAA+PROBE: SIGNIFICANT CHANGE UP
SOURCE RESPIRATORY: SIGNIFICANT CHANGE UP

## 2025-05-30 PROCEDURE — 93010 ELECTROCARDIOGRAM REPORT: CPT

## 2025-05-30 PROCEDURE — 94640 AIRWAY INHALATION TREATMENT: CPT

## 2025-05-30 PROCEDURE — 99285 EMERGENCY DEPT VISIT HI MDM: CPT

## 2025-05-30 PROCEDURE — 93005 ELECTROCARDIOGRAM TRACING: CPT

## 2025-05-30 PROCEDURE — 0241U: CPT

## 2025-05-30 PROCEDURE — 99285 EMERGENCY DEPT VISIT HI MDM: CPT | Mod: 25

## 2025-05-30 PROCEDURE — 71046 X-RAY EXAM CHEST 2 VIEWS: CPT | Mod: 26

## 2025-05-30 PROCEDURE — 71046 X-RAY EXAM CHEST 2 VIEWS: CPT

## 2025-05-30 RX ORDER — VANCOMYCIN HCL IN 5 % DEXTROSE 1.5G/250ML
1000 PLASTIC BAG, INJECTION (ML) INTRAVENOUS ONCE
Refills: 0 | Status: DISCONTINUED | OUTPATIENT
Start: 2025-05-30 | End: 2025-05-30

## 2025-05-30 RX ORDER — IPRATROPIUM BROMIDE AND ALBUTEROL SULFATE .5; 2.5 MG/3ML; MG/3ML
3 SOLUTION RESPIRATORY (INHALATION)
Refills: 0 | Status: COMPLETED | OUTPATIENT
Start: 2025-05-30 | End: 2025-05-30

## 2025-05-30 RX ORDER — PREDNISONE 20 MG/1
3 TABLET ORAL
Qty: 12 | Refills: 0
Start: 2025-05-30 | End: 2025-06-02

## 2025-05-30 RX ORDER — PIPERACILLIN-TAZO-DEXTROSE,ISO 2.25G/50ML
3.38 IV SOLUTION, PIGGYBACK PREMIX FROZEN(ML) INTRAVENOUS ONCE
Refills: 0 | Status: DISCONTINUED | OUTPATIENT
Start: 2025-05-30 | End: 2025-05-30

## 2025-05-30 RX ORDER — PREDNISONE 20 MG/1
50 TABLET ORAL ONCE
Refills: 0 | Status: COMPLETED | OUTPATIENT
Start: 2025-05-30 | End: 2025-05-30

## 2025-05-30 RX ADMIN — IPRATROPIUM BROMIDE AND ALBUTEROL SULFATE 3 MILLILITER(S): .5; 2.5 SOLUTION RESPIRATORY (INHALATION) at 15:22

## 2025-05-30 RX ADMIN — IPRATROPIUM BROMIDE AND ALBUTEROL SULFATE 3 MILLILITER(S): .5; 2.5 SOLUTION RESPIRATORY (INHALATION) at 15:33

## 2025-05-30 RX ADMIN — IPRATROPIUM BROMIDE AND ALBUTEROL SULFATE 3 MILLILITER(S): .5; 2.5 SOLUTION RESPIRATORY (INHALATION) at 15:53

## 2025-05-30 RX ADMIN — PREDNISONE 50 MILLIGRAM(S): 20 TABLET ORAL at 15:20

## 2025-05-30 NOTE — ED ADULT NURSE NOTE - NSFALLUNIVINTERV_ED_ALL_ED
Bed/Stretcher in lowest position, wheels locked, appropriate side rails in place/Call bell, personal items and telephone in reach/Instruct patient to call for assistance before getting out of bed/chair/stretcher/Non-slip footwear applied when patient is off stretcher/[place] to call system/Physically safe environment - no spills, clutter or unnecessary equipment/Purposeful proactive rounding/Room/bathroom lighting operational, light cord in reach

## 2025-05-30 NOTE — ED STATDOCS - OBJECTIVE STATEMENT
34-year-old male history of asthma never been hospitalized for it presents to the emergency department for shortness of breath and wheezing.  Patient states that over the last few days he has had nasal congestion cough wheezing has used his rescue inhaler but has required using it a few times so came in for evaluation states he thinks he needs steroids.  No chest pain no leg swelling no recent travel or surgeries.

## 2025-05-30 NOTE — ED ADULT NURSE NOTE - OBJECTIVE STATEMENT
34y male AAOx3 ambulatory from home presents to ED complaining of SOB. PMHx asthma. Reports for last few days increased congestion and SOB with exertion. +Dry cough, and mild chest tightness. Has used rescue inhaler at home and has not experienced relief. Speaking in clear full sentences. Medicated as per MD order, no acute distress.

## 2025-05-30 NOTE — ED STATDOCS - PHYSICAL EXAMINATION
Constitutional: NAD AAOx3  Eyes: PERRLA EOMI  Head: Normocephalic atraumatic  Mouth: MMM  Cardiac: mild tachycardia   Resp: wheezing bilaterally , +nasal congestion.  GI: Abd s/nt/nd  Neuro: grossly normal and intact  Skin: No visible rashes

## 2025-05-30 NOTE — ED STATDOCS - PATIENT PORTAL LINK FT
You can access the FollowMyHealth Patient Portal offered by [facility] by registering at the following website: [email or web address]. By joining [facility]’s FollowMyHealth portal, you will also be able to view your health information using other applications (apps) compatible with our system.

## 2025-05-30 NOTE — ED STATDOCS - CLINICAL SUMMARY MEDICAL DECISION MAKING FREE TEXT BOX
34-year-old male history of asthma never been hospitalized for it presents to the emergency department for shortness of breath and wheezing.  Patient states that over the last few days he has had nasal congestion cough wheezing has used his rescue inhaler but has required using it a few times so came in for evaluation states he thinks he needs steroids.  No chest pain no leg swelling no recent travel or surgeries.  Here patient with mild tachycardia wheezing bilaterally nasal congestion.  Patient with asthma exacerbation likely secondary to a viral URI with cough versus allergic rhinitis.  Will obtain x-ray EKG treat symptomatically reassess.  Symptoms consistent with asthma exacerbation.

## 2025-05-30 NOTE — ED STATDOCS - PROGRESS NOTE DETAILS
34-year-old male with past medical history of asthma and allergies presents to the ED complaining of shortness of breath and wheezing.  Patient reports he has been using his albuterol inhaler increasingly without relief at home.  Patient reports he was on a steroid inhaler in the past but his insurance ended and he has been unable to get that medication recently.  Patient denies having fevers or chills.  Patient will go for chest x-ray received nebs and p.o. prednisone in the ED.  Patient will have a swab for flu and COVID and be reevaluated. On reeval patient is receiving his third albuterol neb.  Patient appears comfortable in appearance no respiratory distress evident.  Speaking in full sentences.  Negative nasal flaring right upper lung with scant wheeze otherwise clear to auscultation bilaterally at this time.  Patient will finish neb.  Will get repeat vital signs and plan to discharge home with p.o. prednisone to continue for 4 more days.  Patient will be referred to pulmonary for follow-up.  Patient has enough of his albuterol inhaler remaining.  Jackelyn Gray PA-C

## 2025-05-30 NOTE — ED STATDOCS - ATTENDING APP SHARED VISIT CONTRIBUTION OF CARE
I, Giuseppe Chun MD, personally saw the patient with ACP.  I have personally performed a face to face diagnostic evaluation on this patient.   The initial assessment was performed by myself and then the patient was handed off to the ACP. The patient was followed and re-evaluated by the ACP. All labs, imaging and procedures were evaluated and performed by the ACP and I was available for consultation if any questions in the patients care came up.   I personally made/approved the management plan and take responsibility for the patient management.

## 2025-05-30 NOTE — ED STATDOCS - NSFOLLOWUPINSTRUCTIONS_ED_ALL_ED_FT
Asthma, Adult  Outline of a person's upper body showing the lungs, with close-ups of two airways, one normal and one narrow.  Asthma is a long-term (chronic) condition that causes recurrent episodes in which the lower airways in the lungs become tight and narrow. The narrowing is caused by inflammation and tightening of the smooth muscle around the lower airways.    Asthma episodes, also called asthma attacks or asthma flares, may cause coughing, making high-pitched whistling sounds when you breathe, most often when you breathe out (wheezing), shortness of breath, and chest pain. The airways may produce extra mucus caused by the inflammation and irritation. During an attack, it can be difficult to breathe. Asthma attacks can range from minor to life-threatening.    Asthma cannot be cured, but medicines and lifestyle changes can help control it and treat acute attacks. It is important to keep your asthma well controlled so the condition does not interfere with your daily life.    What are the causes?  This condition is believed to be caused by inherited (genetic) and environmental factors, but its exact cause is not known.    What can trigger an asthma attack?    Many things can bring on an asthma attack or make symptoms worse. These triggers are different for every person. Common triggers include:  Allergens and irritants like mold, dust, pet dander, cockroaches, pollen, air pollution, and chemical odors.  Cigarette smoke.  Weather changes and cold air.  Stress and strong emotional responses such as crying or laughing hard.  Certain medications such as aspirin or beta blockers.  Infections and inflammatory conditions, such as the flu, a cold, pneumonia, or inflammation of the nasal membranes (rhinitis).  Gastroesophageal reflux disease (GERD).  What are the signs or symptoms?  Symptoms may occur right after exposure to an asthma trigger or hours later and can vary by person. Common signs and symptoms include:  Wheezing.  Trouble breathing (shortness of breath).  Excessive nighttime or early morning coughing.  Chest tightness.  Tiredness (fatigue) with minimal activity.  Difficulty talking in complete sentences.  Poor exercise tolerance.  How is this diagnosed?  This condition is diagnosed based on:  A physical exam and your medical history.  Tests, which may include:  Lung function studies to evaluate the flow of air in your lungs.  Allergy tests.  Imaging tests, such as X-rays.  How is this treated?  There is no cure, but symptoms can be controlled with proper treatment. Treatment usually involves:  Identifying and avoiding your asthma triggers.  Inhaled medicines. Two types are commonly used to treat asthma, depending on severity:  Controller medicines. These help prevent asthma symptoms from occurring. They are taken every day.  Fast-acting reliever or rescue medicines. These quickly relieve asthma symptoms. They are used as needed and provide short-term relief.  Using other medicines, such as:  Allergy medicines, such as antihistamines, if your asthma attacks are triggered by allergens.  Immune medicines (immunomodulators). These are medicines that help control the immune system.  Using supplemental oxygen. This is only needed during a severe episode.  Creating an asthma action plan. An asthma action plan is a written plan for managing and treating your asthma attacks. This plan includes:  A list of your asthma triggers and how to avoid them.  Information about when medicines should be taken and when their dosage should be changed.  Instructions about using a device called a peak flow meter. A peak flow meter measures how well the lungs are working and the severity of your asthma. It helps you monitor your condition.  Follow these instructions at home:  Take over-the-counter and prescription medicines only as told by your health care provider.  Stay up to date on all vaccinations as recommended by your healthcare provider, including vaccines for the flu and pneumonia.  Use a peak flow meter and keep track of your peak flow readings.  Understand and use your asthma action plan to address any asthma flares.  Do not smoke or allow anyone to smoke in your home.  Contact a health care provider if:  You have wheezing, shortness of breath, or a cough that is not responding to medicines.  Your medicines are causing side effects, such as a rash, itching, swelling, or trouble breathing.  You need to use a reliever medicine more than 2–3 times a week.  Your peak flow reading is still at 50–79% of your personal best after following your action plan for 1 hour.  You have a fever and shortness of breath.  Get help right away if:  You are getting worse and do not respond to treatment during an asthma attack.  You are short of breath when at rest or when doing very little physical activity.  You have difficulty eating, drinking, or talking.  You have chest pain or tightness.  You develop a fast heartbeat or palpitations.  You have a bluish color to your lips or fingernails.  You are light-headed or dizzy, or you faint.  Your peak flow reading is less than 50% of your personal best.  You feel too tired to breathe normally.  These symptoms may be an emergency. Get help right away. Call 911.  Do not wait to see if the symptoms will go away.  Do not drive yourself to the hospital.  Summary  Asthma is a long-term (chronic) condition that causes recurrent episodes in which the airways become tight and narrow. Asthma episodes, also called asthma attacks or asthma flares, can cause coughing, wheezing, shortness of breath, and chest pain.  Asthma cannot be cured, but medicines and lifestyle changes can help keep it well controlled and prevent asthma flares.  Make sure you understand how to avoid triggers and how and when to use your medicines.  Asthma attacks can range from minor to life-threatening. Get help right away if you have an asthma attack and do not respond to treatment with your usual rescue medicines.  This information is not intended to replace advice given to you by your health care provider. Make sure you discuss any questions you have with your health care provider.    Document Revised: 10/05/2022 Document Reviewed: 09/26/2022  [employer] © 2025 [employer]  [employer] logo  Terms and Conditions  Privacy Policy  Editorial Policy  All content on this site: Copyright © 2025 [employer], its licensors, and contributors. All rights are reserved, including those for text and data mining, AI training, and similar technologies. For all open access content, the Creative Commons licensing terms apply.  Cookies are used by this site. To decline or learn more, vis

## 2025-05-30 NOTE — ED ADULT TRIAGE NOTE - CHIEF COMPLAINT QUOTE
Ambulatory into the ed C/O SOB. Pt endorsing s/s progressively worsening over the last 2 days unrelieved with rescue inhaler. pmhx asthma. Pt endorsing chest tightness, denies fevers or chills. SPO2 in triage 93% RA, . STAT EKG in progress

## 2025-06-03 ENCOUNTER — APPOINTMENT (OUTPATIENT)
Dept: PULMONOLOGY | Facility: CLINIC | Age: 35
End: 2025-06-03

## 2025-08-01 ENCOUNTER — EMERGENCY (EMERGENCY)
Facility: HOSPITAL | Age: 35
LOS: 0 days | Discharge: HOME | End: 2025-08-01
Attending: EMERGENCY MEDICINE
Payer: COMMERCIAL

## 2025-08-01 VITALS
WEIGHT: 279.33 LBS | DIASTOLIC BLOOD PRESSURE: 79 MMHG | OXYGEN SATURATION: 97 % | RESPIRATION RATE: 18 BRPM | HEART RATE: 74 BPM | SYSTOLIC BLOOD PRESSURE: 126 MMHG | TEMPERATURE: 98 F

## 2025-08-01 DIAGNOSIS — I49.8 OTHER SPECIFIED CARDIAC ARRHYTHMIAS: ICD-10-CM

## 2025-08-01 DIAGNOSIS — Z98.890 OTHER SPECIFIED POSTPROCEDURAL STATES: Chronic | ICD-10-CM

## 2025-08-01 DIAGNOSIS — J45.901 UNSPECIFIED ASTHMA WITH (ACUTE) EXACERBATION: ICD-10-CM

## 2025-08-01 DIAGNOSIS — R06.02 SHORTNESS OF BREATH: ICD-10-CM

## 2025-08-01 DIAGNOSIS — R07.81 PLEURODYNIA: ICD-10-CM

## 2025-08-01 PROCEDURE — 99284 EMERGENCY DEPT VISIT MOD MDM: CPT

## 2025-08-01 PROCEDURE — 93005 ELECTROCARDIOGRAM TRACING: CPT

## 2025-08-01 PROCEDURE — 93010 ELECTROCARDIOGRAM REPORT: CPT

## 2025-08-01 PROCEDURE — 71046 X-RAY EXAM CHEST 2 VIEWS: CPT | Mod: 26

## 2025-08-01 PROCEDURE — 99283 EMERGENCY DEPT VISIT LOW MDM: CPT | Mod: 25

## 2025-08-01 PROCEDURE — 71046 X-RAY EXAM CHEST 2 VIEWS: CPT

## 2025-08-01 RX ORDER — PREDNISONE 20 MG/1
40 TABLET ORAL ONCE
Refills: 0 | Status: COMPLETED | OUTPATIENT
Start: 2025-08-01 | End: 2025-08-01

## 2025-08-01 RX ORDER — PREDNISONE 20 MG/1
2 TABLET ORAL
Qty: 8 | Refills: 0
Start: 2025-08-01 | End: 2025-08-04

## 2025-08-01 RX ADMIN — PREDNISONE 40 MILLIGRAM(S): 20 TABLET ORAL at 19:39

## 2025-08-01 NOTE — ED STATDOCS - NS ED ATTENDING STATEMENT MOD
Attending Only This was a shared visit with the ANNE-MARIE. I reviewed and verified the documentation.

## 2025-08-01 NOTE — ED STATDOCS - ATTENDING APP SHARED VISIT CONTRIBUTION OF CARE
I have personally seen and examined this patient. I have fully participated in the care of this patient. I have made amendments to the documentation where appropriate and otherwise agree with the history, physical exam, and plan as documented by the Resident/ACP/Student.     Attending Contribution to Care: Jez Mendoza M.D.   I have personally performed a face to face medical and diagnostic evaluation of the patient. I have discussed with and reviewed the Resident's and/or ACP's and/or Medical/PA/NP student's note and agree with the History, ROS, Physical Exam and MDM unless otherwise indicated. A brief summary of my personal evaluation and impression can be found below and/or in mdm if I have documented anything in that section.

## 2025-08-01 NOTE — ED STATDOCS - PATIENT PORTAL LINK FT
You can access the FollowMyHealth Patient Portal offered by [facility] by registering at the following website: [email or web address]. By joining [employer]’s FollowMyHealth portal, you will also be able to view your health information using other applications (apps) compatible with our system.

## 2025-08-01 NOTE — ED STATDOCS - TOBACCO USE
Problem: Patient Centered Care  Goal: Patient preferences are identified and integrated in the patient's plan of care  Description: Interventions:  - What would you like us to know as we care for you? From [place]   - Provide timely, complete, and accurate information to patient/family  - Incorporate patient and family knowledge, values, beliefs, and cultural backgrounds into the planning and delivery of care  - Encourage patient/family to participate in care and decision-making at the level they choose  - Honor patient and family perspectives and choices  Outcome: Progressing     Problem: Delirium  Goal: Minimize duration of delirium  Description: Interventions:  - Encourage use of hearing aids, eye glasses  - Promote highest level of mobility daily  - Provide frequent reorientation  - Promote wakefulness i.e. lights on, blinds open  - Promote sleep, encourage patient's normal rest cycle i.e. lights off, TV off, minimize noise and interruptions  - Encourage family to assist in orientation and promotion of home routines  Outcome: Progressing     Problem: CARDIOVASCULAR - ADULT  Goal: Maintains optimal cardiac output and hemodynamic stability  Description: INTERVENTIONS:  - Monitor vital signs, rhythm, and trends  - Monitor for bleeding, hypotension and signs of decreased cardiac output  - Evaluate effectiveness of vasoactive medications to optimize hemodynamic stability  - Monitor arterial and/or venous puncture sites for bleeding and/or hematoma  - Assess quality of pulses, skin color and temperature  - Assess for signs of decreased coronary artery perfusion - ex.  Angina  - Evaluate fluid balance, assess for edema, trend weights  Outcome: Progressing     Problem: RESPIRATORY - ADULT  Goal: Achieves optimal ventilation and oxygenation  Description: INTERVENTIONS:  - Assess for changes in respiratory status  - Assess for changes in mentation and behavior  - Position to facilitate oxygenation and minimize respiratory effort  - Oxygen supplementation based on oxygen saturation or ABGs  - Provide Smoking Cessation handout, if applicable  - Encourage broncho-pulmonary hygiene including cough, deep breathe, Incentive Spirometry  - Assess the need for suctioning and perform as needed  - Assess and instruct to report SOB or any respiratory difficulty  - Respiratory Therapy support as indicated  - Manage/alleviate anxiety  - Monitor for signs/symptoms of CO2 retention  Outcome: Progressing     Problem: METABOLIC/FLUID AND ELECTROLYTES - ADULT  Goal: Electrolytes maintained within normal limits  Description: INTERVENTIONS:  - Monitor labs and rhythm and assess patient for signs and symptoms of electrolyte imbalances  - Administer electrolyte replacement as ordered  - Monitor response to electrolyte replacements, including rhythm and repeat lab results as appropriate  - Fluid restriction as ordered  - Instruct patient on fluid and nutrition restrictions as appropriate  Outcome: Progressing  Goal: Hemodynamic stability and optimal renal function maintained  Description: INTERVENTIONS:  - Monitor labs and assess for signs and symptoms of volume excess or deficit  - Monitor intake, output and patient weight  - Monitor urine specific gravity, serum osmolarity and serum sodium as indicated or ordered  - Monitor response to interventions for patient's volume status, including labs, urine output, blood pressure (other measures as available)  - Encourage oral intake as appropriate  - Instruct patient on fluid and nutrition restrictions as appropriate  Outcome: Progressing     Problem: SKIN/TISSUE INTEGRITY - ADULT  Goal: Skin integrity remains intact  Description: INTERVENTIONS  - Assess and document risk factors for pressure ulcer development  - Assess and document skin integrity  - Monitor for areas of redness and/or skin breakdown  - Initiate interventions, skin care algorithm/standards of care as needed  Outcome: Progressing Problem: HEMATOLOGIC - ADULT  Goal: Maintains hematologic stability  Description: INTERVENTIONS  - Assess for signs and symptoms of bleeding or hemorrhage  - Monitor labs and vital signs for trends  - Administer supportive blood products/factors, fluids and medications as ordered and appropriate  - Administer supportive blood products/factors as ordered and appropriate  Outcome: Progressing  Goal: Free from bleeding injury  Description: (Example usage: patient with low platelets)  INTERVENTIONS:  - Avoid intramuscular injections, enemas and rectal medication administration  - Ensure safe mobilization of patient  - Hold pressure on venipuncture sites to achieve adequate hemostasis  - Assess for signs and symptoms of internal bleeding  - Monitor lab trends  - Patient is to report abnormal signs of bleeding to staff  - Avoid use of toothpicks and dental floss  - Use electric shaver for shaving  - Use soft bristle tooth brush  - Limit straining and forceful nose blowing  Outcome: Progressing     Problem: MUSCULOSKELETAL - ADULT  Goal: Return mobility to safest level of function  Description: INTERVENTIONS:  - Assess patient stability and activity tolerance for standing, transferring and ambulating w/ or w/o assistive devices  - Assist with transfers and ambulation using safe patient handling equipment as needed  - Ensure adequate protection for wounds/incisions during mobilization  - Obtain PT/OT consults as needed  - Advance activity as appropriate  - Communicate ordered activity level and limitations with patient/family  Outcome: Progressing     Problem: Impaired Functional Mobility  Goal: Achieve highest/safest level of mobility/gait  Description: Interventions:  - Assess patient's functional ability and stability  - Promote increasing activity/tolerance for mobility and gait  - Educate and engage patient/family in tolerated activity level and precautions  - Recommend use of  RW for transfers and ambulation  Outcome: Progressing     Problem: Impaired Activities of Daily Living  Goal: Achieve highest/safest level of independence in self care  Description: Interventions:  - Assess ability and encourage patient to participate in ADLs to maximize function  - Promote sitting position while performing ADLs such as feeding, grooming, and bathing  - Educate and encourage patient/family in tolerated functional activity level and precautions during self-care  - Encourage patient to incorporate impaired side during daily activities to promote function  Outcome: Progressing     Problem: PAIN - ADULT  Goal: Verbalizes/displays adequate comfort level or patient's stated pain goal  Description: INTERVENTIONS:  - Encourage pt to monitor pain and request assistance  - Assess pain using appropriate pain scale  - Administer analgesics based on type and severity of pain and evaluate response  - Implement non-pharmacological measures as appropriate and evaluate response  - Consider cultural and social influences on pain and pain management  - Manage/alleviate anxiety  - Utilize distraction and/or relaxation techniques  - Monitor for opioid side effects  - Notify MD/LIP if interventions unsuccessful or patient reports new pain  - Anticipate increased pain with activity and pre-medicate as appropriate  Outcome: Progressing     Problem: SAFETY ADULT - FALL  Goal: Free from fall injury  Description: INTERVENTIONS:  - Assess pt frequently for physical needs  - Identify cognitive and physical deficits and behaviors that affect risk of falls.   - [place] fall precautions as indicated by assessment.  - Educate pt/family on patient safety including physical limitations  - Instruct pt to call for assistance with activity based on assessment  - Modify environment to reduce risk of injury  - Provide assistive devices as appropriate  - Consider OT/PT consult to assist with strengthening/mobility  - Encourage toileting schedule  Outcome: Progressing     Problem: DISCHARGE PLANNING  Goal: Discharge to home or other facility with appropriate resources  Description: INTERVENTIONS:  - Identify barriers to discharge w/pt and caregiver  - Include patient/family/discharge partner in discharge planning  - Arrange for needed discharge resources and transportation as appropriate  - Identify discharge learning needs (meds, wound care, etc)  - Arrange for interpreters to assist at discharge as needed  - Consider post-discharge preferences of patient/family/discharge partner  - Complete POLST form as appropriate  - Assess patient's ability to be responsible for managing their own health  - Refer to Case Management Department for coordinating discharge planning if the patient needs post-hospital services based on physician/LIP order or complex needs related to functional status, cognitive ability or social support system  Outcome: Progressing Unknown if ever smoked

## 2025-08-01 NOTE — ED STATDOCS - NS_ ATTENDINGSCRIBEDETAILS _ED_A_ED_FT
The history, relevant review of systems, past medical and surgical history, medical decision making, and physical examination was documented by the scribe in my presence and I attest to the accuracy of the documentation (Jez Mendoza M.D.).

## 2025-08-01 NOTE — ED ADULT TRIAGE NOTE - CHIEF COMPLAINT QUOTE
pt states over stretched Monday and since has soreness on left rib side. pt now having shortness and breath x 2 days. denies chest pain. hx of asthma. pt taken for stat ekg. nkda. pt well appearing in triage

## 2025-08-01 NOTE — ED STATDOCS - CLINICAL SUMMARY MEDICAL DECISION MAKING FREE TEXT BOX
36 y/o M with pmhx of assthma presenting for 2 days of SOB also L sided anterior rib pain. has been using albuterol and injaler with relief. no fever, chills, recent travel. no bruisng on the ribs. vitals within normal limits.    General: NAD  HEENT: NCAT. Non erythematous, non swollen tonsils. No exudates.  Cardiac: RRR, no murmurs, well perfused extremities. equal radial pulses  Chest: CTA. No crackles/wheezing   Abdomen: soft, non-distended, no ttp, no rebound or guarding  Extremities: no peripheral edema, calf tenderness, or leg size discrepancies  Skin: no rashes  Neuro: AAOx3, motor and sensory grossly intact. CN II-XII intact. 5/5 strength upper and lower extremities. Normal sensation bilaterally upper and lower extremities. Normal gait.  Psych: mood and affect appropriate     Concern at this time for acute asthma exacerbation, not needing any albuterol treatment at this time. Pt without wheezing. Also with concern for possible L-sided rib fracture but low suspicion. Incentive spirometer of 2.5L. Will do screening XR, put on prednisone 40mg for 5 days, will d/c 34 y/o M with pmhx of asthma presenting for 2 days of SOB. Also with L sided anterior rib pain. Has been using albuterol and inhaler with relief. No fever, chills, recent travel. No bruising on the ribs. Vitals within normal limits.    General: NAD  HEENT: NCAT. Non erythematous, non swollen tonsils. No exudates.  Cardiac: RRR, no murmurs, well perfused extremities. equal radial pulses  Chest: CTA. No crackles/wheezing   Abdomen: soft, non-distended, no ttp, no rebound or guarding  Extremities: no peripheral edema, calf tenderness, or leg size discrepancies  Skin: no rashes  Neuro: AAOx3, motor and sensory grossly intact. CN II-XII intact. 5/5 strength upper and lower extremities. Normal sensation bilaterally upper and lower extremities. Normal gait.  Psych: mood and affect appropriate     Concern at this time for acute asthma exacerbation, not needing any albuterol treatment at this time. Pt without wheezing. Also with concern for possible L-sided rib fracture but low suspicion. Incentive spirometer of 2.5L. Will do screening XR, put on prednisone 40mg for 5 days, will d/c

## 2025-09-12 ENCOUNTER — EMERGENCY (EMERGENCY)
Facility: HOSPITAL | Age: 35
LOS: 0 days | Discharge: HOME | End: 2025-09-12
Attending: EMERGENCY MEDICINE
Payer: COMMERCIAL

## 2025-09-12 VITALS
DIASTOLIC BLOOD PRESSURE: 79 MMHG | SYSTOLIC BLOOD PRESSURE: 120 MMHG | TEMPERATURE: 98 F | WEIGHT: 279.77 LBS | OXYGEN SATURATION: 94 % | HEART RATE: 88 BPM

## 2025-09-12 DIAGNOSIS — Z98.890 OTHER SPECIFIED POSTPROCEDURAL STATES: Chronic | ICD-10-CM

## 2025-09-12 DIAGNOSIS — R07.89 OTHER CHEST PAIN: ICD-10-CM

## 2025-09-12 DIAGNOSIS — J45.901 UNSPECIFIED ASTHMA WITH (ACUTE) EXACERBATION: ICD-10-CM

## 2025-09-12 PROCEDURE — 99283 EMERGENCY DEPT VISIT LOW MDM: CPT

## 2025-09-12 PROCEDURE — 94640 AIRWAY INHALATION TREATMENT: CPT

## 2025-09-12 PROCEDURE — 99283 EMERGENCY DEPT VISIT LOW MDM: CPT | Mod: 25

## 2025-09-12 RX ORDER — ALBUTEROL SULFATE 2.5 MG/3ML
2 VIAL, NEBULIZER (ML) INHALATION
Qty: 1 | Refills: 0
Start: 2025-09-12

## 2025-09-12 RX ORDER — PREDNISONE 20 MG/1
50 TABLET ORAL ONCE
Refills: 0 | Status: COMPLETED | OUTPATIENT
Start: 2025-09-12 | End: 2025-09-12

## 2025-09-12 RX ORDER — IPRATROPIUM BROMIDE AND ALBUTEROL SULFATE .5; 2.5 MG/3ML; MG/3ML
3 SOLUTION RESPIRATORY (INHALATION) ONCE
Refills: 0 | Status: COMPLETED | OUTPATIENT
Start: 2025-09-12 | End: 2025-09-12

## 2025-09-12 RX ORDER — PREDNISONE 20 MG/1
2 TABLET ORAL
Qty: 10 | Refills: 0
Start: 2025-09-12 | End: 2025-09-16

## 2025-09-12 RX ADMIN — IPRATROPIUM BROMIDE AND ALBUTEROL SULFATE 3 MILLILITER(S): .5; 2.5 SOLUTION RESPIRATORY (INHALATION) at 14:29

## 2025-09-12 RX ADMIN — PREDNISONE 50 MILLIGRAM(S): 20 TABLET ORAL at 14:28
